# Patient Record
Sex: MALE | Race: WHITE | NOT HISPANIC OR LATINO | Employment: FULL TIME | URBAN - METROPOLITAN AREA
[De-identification: names, ages, dates, MRNs, and addresses within clinical notes are randomized per-mention and may not be internally consistent; named-entity substitution may affect disease eponyms.]

---

## 2018-06-20 ENCOUNTER — RECORDS - HEALTHEAST (OUTPATIENT)
Dept: LAB | Facility: CLINIC | Age: 53
End: 2018-06-20

## 2018-06-20 LAB
ANION GAP SERPL CALCULATED.3IONS-SCNC: 8 MMOL/L (ref 5–18)
BUN SERPL-MCNC: 14 MG/DL (ref 8–22)
CALCIUM SERPL-MCNC: 9.6 MG/DL (ref 8.5–10.5)
CHLORIDE BLD-SCNC: 111 MMOL/L (ref 98–107)
CHOLEST SERPL-MCNC: 230 MG/DL
CO2 SERPL-SCNC: 22 MMOL/L (ref 22–31)
CREAT SERPL-MCNC: 0.89 MG/DL (ref 0.7–1.3)
FASTING STATUS PATIENT QL REPORTED: ABNORMAL
GFR SERPL CREATININE-BSD FRML MDRD: >60 ML/MIN/1.73M2
GLUCOSE BLD-MCNC: 89 MG/DL (ref 70–125)
HDLC SERPL-MCNC: 38 MG/DL
LDLC SERPL CALC-MCNC: 140 MG/DL
POTASSIUM BLD-SCNC: 4.3 MMOL/L (ref 3.5–5)
SODIUM SERPL-SCNC: 141 MMOL/L (ref 136–145)
TRIGL SERPL-MCNC: 259 MG/DL

## 2019-07-22 ENCOUNTER — RECORDS - HEALTHEAST (OUTPATIENT)
Dept: LAB | Facility: CLINIC | Age: 54
End: 2019-07-22

## 2019-07-22 LAB — PSA SERPL-MCNC: 2.1 NG/ML (ref 0–3.5)

## 2019-07-23 LAB — BACTERIA SPEC CULT: NO GROWTH

## 2019-12-16 ENCOUNTER — RECORDS - HEALTHEAST (OUTPATIENT)
Dept: LAB | Facility: CLINIC | Age: 54
End: 2019-12-16

## 2019-12-17 LAB — BACTERIA SPEC CULT: NO GROWTH

## 2019-12-31 ENCOUNTER — RECORDS - HEALTHEAST (OUTPATIENT)
Dept: LAB | Facility: CLINIC | Age: 54
End: 2019-12-31

## 2019-12-31 LAB
ALBUMIN UR-MCNC: NEGATIVE MG/DL
APPEARANCE UR: CLEAR
BACTERIA #/AREA URNS HPF: NORMAL HPF
BILIRUB UR QL STRIP: NEGATIVE
COLOR UR AUTO: COLORLESS
GLUCOSE UR STRIP-MCNC: NEGATIVE MG/DL
HGB UR QL STRIP: NEGATIVE
KETONES UR STRIP-MCNC: NEGATIVE MG/DL
LEUKOCYTE ESTERASE UR QL STRIP: NEGATIVE
NITRATE UR QL: NEGATIVE
PH UR STRIP: 6 [PH] (ref 4.5–8)
RBC #/AREA URNS AUTO: NORMAL HPF
SP GR UR STRIP: 1 (ref 1–1.03)
SQUAMOUS #/AREA URNS AUTO: NORMAL LPF
UROBILINOGEN UR STRIP-ACNC: NORMAL
WBC #/AREA URNS AUTO: NORMAL HPF

## 2020-01-01 LAB — BACTERIA SPEC CULT: NO GROWTH

## 2022-10-21 ENCOUNTER — LAB REQUISITION (OUTPATIENT)
Dept: LAB | Facility: CLINIC | Age: 57
End: 2022-10-21

## 2022-10-21 DIAGNOSIS — Z13.220 ENCOUNTER FOR SCREENING FOR LIPOID DISORDERS: ICD-10-CM

## 2022-10-21 DIAGNOSIS — I10 ESSENTIAL (PRIMARY) HYPERTENSION: ICD-10-CM

## 2022-10-21 LAB
ANION GAP SERPL CALCULATED.3IONS-SCNC: 12 MMOL/L (ref 7–15)
BUN SERPL-MCNC: 18.6 MG/DL (ref 6–20)
CALCIUM SERPL-MCNC: 9.6 MG/DL (ref 8.6–10)
CHLORIDE SERPL-SCNC: 102 MMOL/L (ref 98–107)
CHOLEST SERPL-MCNC: 211 MG/DL
CREAT SERPL-MCNC: 0.96 MG/DL (ref 0.67–1.17)
DEPRECATED HCO3 PLAS-SCNC: 24 MMOL/L (ref 22–29)
GFR SERPL CREATININE-BSD FRML MDRD: >90 ML/MIN/1.73M2
GLUCOSE SERPL-MCNC: 93 MG/DL (ref 70–99)
HDLC SERPL-MCNC: 44 MG/DL
LDLC SERPL CALC-MCNC: 138 MG/DL
NONHDLC SERPL-MCNC: 167 MG/DL
POTASSIUM SERPL-SCNC: 4.3 MMOL/L (ref 3.4–5.3)
SODIUM SERPL-SCNC: 138 MMOL/L (ref 136–145)
TRIGL SERPL-MCNC: 144 MG/DL

## 2022-10-21 PROCEDURE — 80048 BASIC METABOLIC PNL TOTAL CA: CPT | Performed by: FAMILY MEDICINE

## 2022-10-21 PROCEDURE — 80061 LIPID PANEL: CPT | Performed by: FAMILY MEDICINE

## 2022-12-09 ENCOUNTER — LAB REQUISITION (OUTPATIENT)
Dept: LAB | Facility: CLINIC | Age: 57
End: 2022-12-09

## 2022-12-09 DIAGNOSIS — I10 ESSENTIAL (PRIMARY) HYPERTENSION: ICD-10-CM

## 2022-12-09 LAB
ALBUMIN SERPL BCG-MCNC: 4.6 G/DL (ref 3.5–5.2)
ALP SERPL-CCNC: 100 U/L (ref 40–129)
ALT SERPL W P-5'-P-CCNC: 49 U/L (ref 10–50)
ANION GAP SERPL CALCULATED.3IONS-SCNC: 14 MMOL/L (ref 7–15)
AST SERPL W P-5'-P-CCNC: 25 U/L (ref 10–50)
BILIRUB SERPL-MCNC: 0.3 MG/DL
BUN SERPL-MCNC: 12.8 MG/DL (ref 6–20)
CALCIUM SERPL-MCNC: 9.4 MG/DL (ref 8.6–10)
CHLORIDE SERPL-SCNC: 107 MMOL/L (ref 98–107)
CREAT SERPL-MCNC: 0.99 MG/DL (ref 0.67–1.17)
DEPRECATED HCO3 PLAS-SCNC: 25 MMOL/L (ref 22–29)
GFR SERPL CREATININE-BSD FRML MDRD: 89 ML/MIN/1.73M2
GLUCOSE SERPL-MCNC: 91 MG/DL (ref 70–99)
POTASSIUM SERPL-SCNC: 4.9 MMOL/L (ref 3.4–5.3)
PROT SERPL-MCNC: 7.4 G/DL (ref 6.4–8.3)
SODIUM SERPL-SCNC: 146 MMOL/L (ref 136–145)

## 2022-12-09 PROCEDURE — 80053 COMPREHEN METABOLIC PANEL: CPT | Performed by: FAMILY MEDICINE

## 2022-12-09 PROCEDURE — 82040 ASSAY OF SERUM ALBUMIN: CPT | Performed by: FAMILY MEDICINE

## 2022-12-23 ENCOUNTER — LAB REQUISITION (OUTPATIENT)
Dept: LAB | Facility: CLINIC | Age: 57
End: 2022-12-23

## 2022-12-23 DIAGNOSIS — R31.9 HEMATURIA, UNSPECIFIED: ICD-10-CM

## 2022-12-23 PROCEDURE — 87086 URINE CULTURE/COLONY COUNT: CPT | Performed by: FAMILY MEDICINE

## 2022-12-24 LAB — BACTERIA UR CULT: NORMAL

## 2023-09-19 ENCOUNTER — ANCILLARY PROCEDURE (OUTPATIENT)
Dept: GENERAL RADIOLOGY | Facility: CLINIC | Age: 58
End: 2023-09-19
Attending: NURSE PRACTITIONER
Payer: COMMERCIAL

## 2023-09-19 ENCOUNTER — OFFICE VISIT (OUTPATIENT)
Dept: URGENT CARE | Facility: URGENT CARE | Age: 58
End: 2023-09-19
Payer: COMMERCIAL

## 2023-09-19 VITALS
OXYGEN SATURATION: 98 % | HEART RATE: 85 BPM | DIASTOLIC BLOOD PRESSURE: 88 MMHG | WEIGHT: 207 LBS | SYSTOLIC BLOOD PRESSURE: 150 MMHG | TEMPERATURE: 98.1 F

## 2023-09-19 DIAGNOSIS — T14.8XXA ABRASION: ICD-10-CM

## 2023-09-19 DIAGNOSIS — S69.92XA HAND INJURY, LEFT, INITIAL ENCOUNTER: ICD-10-CM

## 2023-09-19 DIAGNOSIS — S62.92XA CLOSED FRACTURE OF LEFT HAND, INITIAL ENCOUNTER: ICD-10-CM

## 2023-09-19 PROCEDURE — 99203 OFFICE O/P NEW LOW 30 MIN: CPT | Mod: 25 | Performed by: NURSE PRACTITIONER

## 2023-09-19 PROCEDURE — 29125 APPL SHORT ARM SPLINT STATIC: CPT | Mod: LT | Performed by: NURSE PRACTITIONER

## 2023-09-19 PROCEDURE — 73130 X-RAY EXAM OF HAND: CPT | Mod: TC | Performed by: RADIOLOGY

## 2023-09-19 PROCEDURE — 90715 TDAP VACCINE 7 YRS/> IM: CPT | Performed by: NURSE PRACTITIONER

## 2023-09-19 PROCEDURE — 90471 IMMUNIZATION ADMIN: CPT | Performed by: NURSE PRACTITIONER

## 2023-09-19 RX ORDER — LOSARTAN POTASSIUM 50 MG/1
TABLET ORAL
COMMUNITY
Start: 2023-01-03

## 2023-09-19 NOTE — PROGRESS NOTES
Chief Complaint   Patient presents with    Urgent Care     Pt in clinic to have eval for left knee, bilateral hands and head injury due to fall today.     SUBJECTIVE:  Ashok Blood is a 58 year old male who presents to the clinic today with an injury that occurred a couple hours ago.  He was walking his dog and fell on concrete. Severe left hand fourth digit edema pain discoloration.  His wedding ring has become so tight that there is purple discoloration and some numbness.  He cannot get it off.  Has full range of motion no pallor or cool sensation pulselessness.  Multiple abrasions across body.  He did hit his head but declines severe headache loss of consciousness vomiting confusion trouble walking talking anticoagulation use.  Last Tdap was done in 2013.    No past medical history on file.  losartan (COZAAR) 50 MG tablet, 1 tab(s) orally once a day for 90    No current facility-administered medications on file prior to visit.    Social History     Tobacco Use    Smoking status: Never    Smokeless tobacco: Never   Substance Use Topics    Alcohol use: Not on file     No Known Allergies    Review of Systems  All systems negative except those listed above in HPI.    EXAM:   BP (!) 150/88   Pulse 85   Temp 98.1  F (36.7  C) (Temporal)   Wt 93.9 kg (207 lb)   SpO2 98%     Physical Exam  Vitals reviewed.   Constitutional:       Appearance: Normal appearance.   HENT:      Head: Normocephalic and atraumatic.      Nose: Nose normal.      Mouth/Throat:      Mouth: Mucous membranes are moist.      Pharynx: Oropharynx is clear.   Eyes:      Extraocular Movements: Extraocular movements intact.      Conjunctiva/sclera: Conjunctivae normal.      Pupils: Pupils are equal, round, and reactive to light.   Cardiovascular:      Rate and Rhythm: Normal rate.      Pulses: Normal pulses.   Pulmonary:      Effort: Pulmonary effort is normal.   Musculoskeletal:         General: Swelling, tenderness, deformity and signs of  injury present. Normal range of motion.      Cervical back: Normal range of motion and neck supple.   Skin:     General: Skin is warm and dry.      Findings: Bruising and erythema present. No rash.   Neurological:      General: No focal deficit present.      Mental Status: He is alert and oriented to person, place, and time.      Sensory: Sensory deficit present.      Motor: No weakness.      Coordination: Coordination normal.      Gait: Gait normal.   Psychiatric:         Mood and Affect: Mood normal.         Behavior: Behavior normal.       X-ray done in clinic read by me as positive for fourth and fifth proximal phalanx fractures.  Results for orders placed or performed in visit on 09/19/23   XR Hand Left G/E 3 Views     Status: None    Narrative    EXAM: XR HAND LEFT G/E 3 VIEWS  LOCATION: [facility]  DATE: 9/19/2023    INDICATION: Fell 2 hours ago on concrete, severe left hand 4th digit edema, pain, discoloration, had to cut ring off in clinic.  COMPARISON: None.      Impression    IMPRESSION: Acute mildly displaced and angulated oblique fractures of the 4th and 5th proximal phalanges with probable intra-articular extension into the MCP joints. Associated soft tissue swelling.    NOTE: ABNORMAL REPORT    THE DICTATION ABOVE DESCRIBES AN ABNORMALITY FOR WHICH FOLLOW-UP IS NEEDED.     ASSESSMENT:    ICD-10-CM    1. Hand injury, left, initial encounter  S69.92XA XR Hand Left G/E 3 Views      2. Abrasion  T14.8XXA TDAP 7+ (ADACEL,BOOSTRIX)      3. Closed fracture of left hand, initial encounter  S62.92XA Orthopedic  Referral     APPLY SHORT ARM SPLINT STATIC        PLAN:    Procedure: Left fourth finger wedding ring was cut off using the metal ring  the clinic.  Patient tolerated well.  The numbness resolved completely following this.  Ulnar gutter splint applied with stockinette Ortho-Glass and Ace wrap's.    Left hand fracture at fourth and fifth proximal  digits  Shared decision making with patient to remove the ring as he was starting to become numb and purple  Splint applied in clinic  Rest ice elevate  Rotate Tylenol ibuprofen, patient declines pain meds  Ortho for follow-up ER if severe pain pallor cool sensation pulselessness    Follow up with primary care provider with any problems, questions or concerns or if symptoms worsen or fail to improve. Patient agreed to plan and verbalized understanding.    Marlee Levy, MLIEP-BC  [facility] [place]

## 2023-09-22 NOTE — TELEPHONE ENCOUNTER
DIAGNOSIS: Closed fracture of left hand, initial encounter \ Negra, Vandana NP \ imaging\ [facility]\ ortho con    APPOINTMENT DATE: 09/30/23   NOTES STATUS DETAILS   DISCHARGE SUMMARY from hospital Internal 09/19/23 - Marlee Omer NP   MEDICATION LIST Internal    XRAYS  & INJECTIONS (IMAGES & REPORTS) Internal XR L HAND:  - 09/19/23

## 2023-09-25 DIAGNOSIS — M79.642 LEFT HAND PAIN: Primary | ICD-10-CM

## 2023-09-25 ASSESSMENT — ENCOUNTER SYMPTOMS
BACK PAIN: 0
NECK PAIN: 0
MUSCLE CRAMPS: 0
STIFFNESS: 0
ARTHRALGIAS: 0
JOINT SWELLING: 0
MYALGIAS: 0
MUSCLE WEAKNESS: 0

## 2023-09-25 NOTE — TELEPHONE ENCOUNTER
DIAGNOSIS: Left Hand Fx   APPOINTMENT DATE: 09/26/2023   NOTES STATUS DETAILS   OFFICE NOTE from referring provider Internal 09/19/2023 - [facility]   MEDICATION LIST Internal    XRAYS (IMAGES & REPORTS) Internal

## 2023-09-26 ENCOUNTER — ANCILLARY PROCEDURE (OUTPATIENT)
Dept: GENERAL RADIOLOGY | Facility: CLINIC | Age: 58
End: 2023-09-26
Attending: PHYSICIAN ASSISTANT
Payer: COMMERCIAL

## 2023-09-26 ENCOUNTER — PRE VISIT (OUTPATIENT)
Dept: ORTHOPEDICS | Facility: CLINIC | Age: 58
End: 2023-09-26

## 2023-09-26 ENCOUNTER — OFFICE VISIT (OUTPATIENT)
Dept: ORTHOPEDICS | Facility: CLINIC | Age: 58
End: 2023-09-26
Payer: COMMERCIAL

## 2023-09-26 DIAGNOSIS — M79.642 HAND PAIN, LEFT: ICD-10-CM

## 2023-09-26 DIAGNOSIS — S62.615A CLOSED DISPLACED FRACTURE OF PROXIMAL PHALANX OF LEFT RING FINGER, INITIAL ENCOUNTER: Primary | ICD-10-CM

## 2023-09-26 DIAGNOSIS — M79.642 HAND PAIN, LEFT: Primary | ICD-10-CM

## 2023-09-26 DIAGNOSIS — S62.92XA CLOSED FRACTURE OF LEFT HAND, INITIAL ENCOUNTER: ICD-10-CM

## 2023-09-26 DIAGNOSIS — S62.617A CLOSED DISPLACED FRACTURE OF PROXIMAL PHALANX OF LEFT LITTLE FINGER, INITIAL ENCOUNTER: ICD-10-CM

## 2023-09-26 PROBLEM — N41.0 ACUTE PROSTATITIS: Status: ACTIVE | Noted: 2020-01-15

## 2023-09-26 PROCEDURE — 73130 X-RAY EXAM OF HAND: CPT | Mod: LT | Performed by: RADIOLOGY

## 2023-09-26 PROCEDURE — 99204 OFFICE O/P NEW MOD 45 MIN: CPT | Mod: 25 | Performed by: PHYSICIAN ASSISTANT

## 2023-09-26 PROCEDURE — 29125 APPL SHORT ARM SPLINT STATIC: CPT | Mod: LT | Performed by: PHYSICIAN ASSISTANT

## 2023-09-26 NOTE — NURSING NOTE
Teaching Flowsheet   Relevant Diagnosis: Left 4th and 5th metacarpal proximal phalangeal fractures  Teaching Topic: Closed reduction percutaneous pinning VS ORIF of the left 4th and 5th metacarpal proximal phalangeal fractures.    CSC under MAC with Regional Block anesthesia with Dr Elli Lamb    Patient has pre-op with PCP office scheduled for tomorrow 9-27-23.    Person(s) involved in teaching:   Patient     Motivation Level:  Asks Questions: Yes  Eager to Learn: Yes  Cooperative: Yes  Receptive (willing/able to accept information): Yes  Any cultural factors/Shinto beliefs that may influence understanding or compliance? No    Patient demonstrates understanding of the following:  Reason for the appointment, diagnosis and treatment plan: Yes  Knowledge of proper use of medications and conditions for which they are ordered (with special attention to potential side effects or drug interactions): Yes  Which situations necessitate calling provider and whom to contact: Yes    Teaching Concerns Addressed:   Proper use and care of  (medical equip, care aids, etc.): Yes  Nutritional needs and diet plan: Yes  Pain management techniques: Yes  Wound Care: Yes  How and/when to access community resources: Yes     Instructional Materials Used/Given: Preoperative surgery packet, antibacterial Chlorhexidine soap. Stop Light Tool reviewed, after-hours number provided, patient verbalized understanding, had no immediate questions. Gabrielle Llamas RN

## 2023-09-26 NOTE — NURSING NOTE
Reason For Visit:   Chief Complaint   Patient presents with    Consult     Consult for mildly displaced left 4th and 5th metacarpal proximal phalange fracture DOI 9/19/23       Primary MD: No Ref-Primary, Physician  Ref. MD: Dr. Holley    Age: 58 year old    ?  No      There were no vitals taken for this visit.      Pain Assessment  Patient Currently in Pain: Yes (tripped while walking the dog)  0-10 Pain Scale: 3  Primary Pain Location: Finger (Comment which one) (left fourth and fifth)  Pain Descriptors: Dull, Intermittent  Alleviating Factors: Other (comment), NSAIDS (splinting)    Hand Dominance Evaluation  Hand Dominance: Right          QuickDASH Assessment      9/25/2023     4:33 PM   QuickDASH Main   1. Open a tight or new jar Mild difficulty   2. Do heavy household chores (e.g., wash walls, floors) Mild difficulty   3. Carry a shopping bag or briefcase Mild difficulty   4. Wash your back Mild difficulty   5. Use a knife to cut food Mild difficulty   6. Recreational activities in which you take some force or impact through your arm, shoulder or hand (e.g., golf, hammering, tennis, etc.) Mild difficulty   7. During the past week, to what extent has your arm, shoulder or hand problem interfered with your normal social activities with family, friends, neighbours or groups Slightly   8. During the past week, were you limited in your work or other regular daily activities as a result of your arm, shoulder or hand problem Slightly limited   9. Arm, shoulder or hand pain Mild   10.Tingling (pins and needles) in your arm,shoulder or hand None   11. During the past week, how much difficulty have you had sleeping because of the pain in your arm, shoulder or hand Mild difficulty   Quickdash Ability Score 22.73          Current Outpatient Medications   Medication Sig Dispense Refill    losartan (COZAAR) 50 MG tablet 1 tab(s) orally once a day for 90         No Known Allergies    Valentina Bae, ATC

## 2023-09-26 NOTE — PROGRESS NOTES
Cast/splint application    Date/Time: 9/26/2023 3:38 PM    Performed by: Wilmer Gutierrez ATC  Authorized by: Jeanette Dave PA-C    Consent:     Consent obtained:  Verbal    Consent given by:  Patient    Risks discussed:  Discoloration, numbness, pain and swelling  Pre-procedure details:     Sensation:  Normal  Procedure details:     Laterality:  Left    Location:  Finger    Finger:  L ring finger and L small finger    Strapping: no      Splint type:  Finger (static) and ulnar gutter    Supplies:  Fiberglass  Post-procedure details:     Pain:  Unchanged    Sensation:  Normal    Patient tolerance of procedure:  Tolerated well, no immediate complications    Patient provided with cast or splint care instructions: Yes

## 2023-09-26 NOTE — LETTER
9/26/2023         RE: Ashok Blood  [address]  [place] [facility] [address]        Dear Colleague,    Thank you for referring your patient, Ashok Blood, to the [facility] [place]. Please see a copy of my visit note below.    Hand Surgery History & Physical    REFERRING PHYSICIAN: No ref. provider found   PRIMARY CARE PHYSICIAN: No Ref-Primary, Physician     Chief Complaint:   Consult (Consult for mildly displaced left 4th and 5th metacarpal proximal phalange fracture DOI 9/19/23)    History of Present Illness:   Ashok Blood is a 58 year old right-hand dominant male who presents for evaluation of left 4th and 5th proximal phalanx fracture. This occurred on 9/19/23 when he fell onto an outstretched hand while walking his dog. He was seen at    Urgent Care where he  was placed in an ulnar gutter splint . They have been taking Ibuprofen. Pain has been well controlled. Denies numbness or tingling.     Patient is quite active. He enjoys biking. He will be leaving on a road trip next week.     Occupation: .     Past Medical History:   No past medical history on file.    Past Surgical History:   No past surgical history on file.    Social History:     Social History     Tobacco Use    Smoking status: Never    Smokeless tobacco: Never   Substance Use Topics    Alcohol use: Not on file       Family History:   No family history on file.    Allergies:   No Known Allergies    Medications:     Current Outpatient Medications   Medication    losartan (COZAAR) 50 MG tablet     No current facility-administered medications for this visit.      Review of Systems:     A 10 point ROS was performed and reviewed. Specific responses to these questions are noted at the end of the document.    Physical Exam:   Physical Exam Adult:    Musculoskeletal: A focused physical examination of the left upper extremity reveals:   Inspection -  Moderate edema to the 4th and 5th fingers and  dorsal hand. Ecchymosis to the dorsal and volar hand.  Palpation - Tenderness to palpation of the 4th and 5th fingers. Nontender throughout the remaining hand.   Neurovascular- intact light touch sensation and motor to distribution of the median, ulnar and radial nerves. Brisk capillary refill to the distal fingers. 2+ radial pulse.   Range of Motion: Flexion of the 4th and 5th fingers limited secondary to pain and swelling. Mild radial rotational deformity of the small finger, mild ulnar rotational deformity of the ring finger.   Muscle strength and tone: 5/5 strength EPL, FPL, APB, first dorsal interosseous.             Imaging:   3 view X-ray of the left hand was independently interpreted by me. The results were discussed with the patient.  Findings include: left 4th and 5th proximal phalanx base fractures with mild hyperextension.      Assessment and Plan:   Assessment:  58 year old male with left 4th and 5th proximal phalanx fractures     Plan: We had a lengthy discussion about the diagnosis, radiographic findings, and possible treatment options.  I discussed with the patient that sometimes proximal phalanx fractures can be treated with and without surgery. We discussed non operative management with cast immobilization. We discussed that during this time the fracture alignment would not improve and could worsen. There may be some deformity to the fingers long term. Operative intervention would be in the form of a closed vs open reduction and percutaneous pinning. I briefly described the procedure and post operative plan. I discussed the risks and benefits of surgery, including but not limited to: infection, bleeding, pain, scarring, damage to nerves, blood vessels, or other nearby structures, malunion, nonunion, hardware failure or malposition, stiffness, need for further surgery, wound healing issues, and anesthetic risks. After full consideration, patient elected to proceed with surgical intervention. This  will be performed by Dr. Elli Lamb. Patient will be placed in a new ulanr gutter splint today that should remain intact until until surgery.     Radiographs and plan discussed with Dr. Elli Lamb who is in agreement with the plan.     Jeanette Dave PA-C   Orthopedic Surgery  9/26/2023 3:09 PM      Cast/splint application    Date/Time: 9/26/2023 3:38 PM    Performed by: Wilmer Gutierrez ATC  Authorized by: Jeanette Dave PA-C    Consent:     Consent obtained:  Verbal    Consent given by:  Patient    Risks discussed:  Discoloration, numbness, pain and swelling  Pre-procedure details:     Sensation:  Normal  Procedure details:     Laterality:  Left    Location:  Finger    Finger:  L ring finger and L small finger    Strapping: no      Splint type:  Finger (static) and ulnar gutter    Supplies:  Fiberglass  Post-procedure details:     Pain:  Unchanged    Sensation:  Normal    Patient tolerance of procedure:  Tolerated well, no immediate complications    Patient provided with cast or splint care instructions: Yes

## 2023-09-26 NOTE — PROGRESS NOTES
Hand Surgery History & Physical    REFERRING PHYSICIAN: No ref. provider found   PRIMARY CARE PHYSICIAN: No Ref-Primary, Physician     Chief Complaint:   Consult (Consult for mildly displaced left 4th and 5th metacarpal proximal phalange fracture DOI 9/19/23)    History of Present Illness:   Ashok Blood is a 58 year old right-hand dominant male who presents for evaluation of left 4th and 5th proximal phalanx fracture. This occurred on 9/19/23 when he fell onto an outstretched hand while walking his dog. He was seen at    Urgent Care where he  was placed in an ulnar gutter splint . They have been taking Ibuprofen. Pain has been well controlled. Denies numbness or tingling.     Patient is quite active. He enjoys biking. He will be leaving on a road trip next week.     Occupation: .     Past Medical History:   No past medical history on file.    Past Surgical History:   No past surgical history on file.    Social History:     Social History     Tobacco Use    Smoking status: Never    Smokeless tobacco: Never   Substance Use Topics    Alcohol use: Not on file       Family History:   No family history on file.    Allergies:   No Known Allergies    Medications:     Current Outpatient Medications   Medication    losartan (COZAAR) 50 MG tablet     No current facility-administered medications for this visit.      Review of Systems:     A 10 point ROS was performed and reviewed. Specific responses to these questions are noted at the end of the document.    Physical Exam:   Physical Exam Adult:    Musculoskeletal: A focused physical examination of the left upper extremity reveals:   Inspection -  Moderate edema to the 4th and 5th fingers and dorsal hand. Ecchymosis to the dorsal and volar hand.  Palpation - Tenderness to palpation of the 4th and 5th fingers. Nontender throughout the remaining hand.   Neurovascular- intact light touch sensation and motor to distribution of the median, ulnar and radial  nerves. Brisk capillary refill to the distal fingers. 2+ radial pulse.   Range of Motion: Flexion of the 4th and 5th fingers limited secondary to pain and swelling. Mild radial rotational deformity of the small finger, mild ulnar rotational deformity of the ring finger.   Muscle strength and tone: 5/5 strength EPL, FPL, APB, first dorsal interosseous.             Imaging:   3 view X-ray of the left hand was independently interpreted by me. The results were discussed with the patient.  Findings include: left 4th and 5th proximal phalanx base fractures with mild hyperextension.      Assessment and Plan:   Assessment:  58 year old male with left 4th and 5th proximal phalanx fractures     Plan: We had a lengthy discussion about the diagnosis, radiographic findings, and possible treatment options.  I discussed with the patient that sometimes proximal phalanx fractures can be treated with and without surgery. We discussed non operative management with cast immobilization. We discussed that during this time the fracture alignment would not improve and could worsen. There may be some deformity to the fingers long term. Operative intervention would be in the form of a closed vs open reduction and percutaneous pinning. I briefly described the procedure and post operative plan. I discussed the risks and benefits of surgery, including but not limited to: infection, bleeding, pain, scarring, damage to nerves, blood vessels, or other nearby structures, malunion, nonunion, hardware failure or malposition, stiffness, need for further surgery, wound healing issues, and anesthetic risks. After full consideration, patient elected to proceed with surgical intervention. This will be performed by Dr. Elli Lamb. Patient will be placed in a new ulanr gutter splint today that should remain intact until until surgery.     Radiographs and plan discussed with Dr. Elli Lamb who is in agreement with the plan.     Jeanette Dave,  JASON   Orthopedic Surgery  9/26/2023 3:09 PM

## 2023-09-27 ENCOUNTER — ANESTHESIA EVENT (OUTPATIENT)
Dept: SURGERY | Facility: AMBULATORY SURGERY CENTER | Age: 58
End: 2023-09-27
Payer: COMMERCIAL

## 2023-09-27 ENCOUNTER — LAB REQUISITION (OUTPATIENT)
Dept: LAB | Facility: CLINIC | Age: 58
End: 2023-09-27

## 2023-09-27 ENCOUNTER — TELEPHONE (OUTPATIENT)
Dept: ORTHOPEDICS | Facility: CLINIC | Age: 58
End: 2023-09-27
Payer: COMMERCIAL

## 2023-09-27 DIAGNOSIS — I10 ESSENTIAL (PRIMARY) HYPERTENSION: ICD-10-CM

## 2023-09-27 PROCEDURE — 82310 ASSAY OF CALCIUM: CPT | Performed by: PHYSICIAN ASSISTANT

## 2023-09-27 NOTE — TELEPHONE ENCOUNTER
Patient is scheduled for surgery with Dr. Lamb    Spoke with: Ashok    Date of Surgery: 9/28/23    Location: [facility]    Informed patient they will need an adult  Yes     Pre op with Provider PCP, patient has pre-op scheduled for 9/27/23    Additional imaging/appointments: POP Made-pt requested 10/11/23 for f/u.    Surgery packet: Received     Additional comments: N/A        Krystal Field on 9/27/2023 at 8:55 AM

## 2023-09-28 ENCOUNTER — ANCILLARY PROCEDURE (OUTPATIENT)
Dept: RADIOLOGY | Facility: AMBULATORY SURGERY CENTER | Age: 58
End: 2023-09-28
Attending: STUDENT IN AN ORGANIZED HEALTH CARE EDUCATION/TRAINING PROGRAM
Payer: COMMERCIAL

## 2023-09-28 ENCOUNTER — ANESTHESIA (OUTPATIENT)
Dept: SURGERY | Facility: AMBULATORY SURGERY CENTER | Age: 58
End: 2023-09-28
Payer: COMMERCIAL

## 2023-09-28 ENCOUNTER — HOSPITAL ENCOUNTER (OUTPATIENT)
Facility: AMBULATORY SURGERY CENTER | Age: 58
Discharge: HOME | End: 2023-09-28
Attending: STUDENT IN AN ORGANIZED HEALTH CARE EDUCATION/TRAINING PROGRAM
Payer: COMMERCIAL

## 2023-09-28 VITALS
SYSTOLIC BLOOD PRESSURE: 115 MMHG | HEART RATE: 91 BPM | TEMPERATURE: 97.4 F | BODY MASS INDEX: 32.95 KG/M2 | OXYGEN SATURATION: 92 % | RESPIRATION RATE: 16 BRPM | WEIGHT: 205 LBS | HEIGHT: 66 IN | DIASTOLIC BLOOD PRESSURE: 73 MMHG

## 2023-09-28 DIAGNOSIS — M25.532 PAIN IN BOTH WRISTS: ICD-10-CM

## 2023-09-28 DIAGNOSIS — S62.617A CLOSED DISPLACED FRACTURE OF PROXIMAL PHALANX OF LEFT LITTLE FINGER, INITIAL ENCOUNTER: Primary | ICD-10-CM

## 2023-09-28 DIAGNOSIS — M25.531 PAIN IN BOTH WRISTS: ICD-10-CM

## 2023-09-28 DIAGNOSIS — S62.615A CLOSED DISPLACED FRACTURE OF PROXIMAL PHALANX OF LEFT RING FINGER, INITIAL ENCOUNTER: Primary | ICD-10-CM

## 2023-09-28 LAB
ANION GAP SERPL CALCULATED.3IONS-SCNC: 14 MMOL/L (ref 7–15)
BUN SERPL-MCNC: 16.2 MG/DL (ref 6–20)
CALCIUM SERPL-MCNC: 9.3 MG/DL (ref 8.6–10)
CHLORIDE SERPL-SCNC: 106 MMOL/L (ref 98–107)
CREAT SERPL-MCNC: 0.98 MG/DL (ref 0.67–1.17)
EGFRCR SERPLBLD CKD-EPI 2021: 90 ML/MIN/1.73M2
GLUCOSE SERPL-MCNC: 94 MG/DL (ref 70–99)
HCO3 SERPL-SCNC: 23 MMOL/L (ref 22–29)
POTASSIUM SERPL-SCNC: 4.4 MMOL/L (ref 3.4–5.3)
SODIUM SERPL-SCNC: 143 MMOL/L (ref 135–145)

## 2023-09-28 PROCEDURE — C1713 ANCHOR/SCREW BN/BN,TIS/BN: HCPCS

## 2023-09-28 PROCEDURE — 26727 TREAT FINGER FRACTURE EACH: CPT | Mod: F3

## 2023-09-28 RX ORDER — NALOXONE HYDROCHLORIDE 0.4 MG/ML
0.4 INJECTION, SOLUTION INTRAMUSCULAR; INTRAVENOUS; SUBCUTANEOUS
Status: DISCONTINUED | OUTPATIENT
Start: 2023-09-28 | End: 2023-09-29 | Stop reason: HOSPADM

## 2023-09-28 RX ORDER — FENTANYL CITRATE 50 UG/ML
25-50 INJECTION, SOLUTION INTRAMUSCULAR; INTRAVENOUS
Status: DISCONTINUED | OUTPATIENT
Start: 2023-09-28 | End: 2023-09-29 | Stop reason: HOSPADM

## 2023-09-28 RX ORDER — SODIUM CHLORIDE, SODIUM LACTATE, POTASSIUM CHLORIDE, CALCIUM CHLORIDE 600; 310; 30; 20 MG/100ML; MG/100ML; MG/100ML; MG/100ML
INJECTION, SOLUTION INTRAVENOUS CONTINUOUS
Status: DISCONTINUED | OUTPATIENT
Start: 2023-09-28 | End: 2023-09-29 | Stop reason: HOSPADM

## 2023-09-28 RX ORDER — NALOXONE HYDROCHLORIDE 0.4 MG/ML
0.2 INJECTION, SOLUTION INTRAMUSCULAR; INTRAVENOUS; SUBCUTANEOUS
Status: DISCONTINUED | OUTPATIENT
Start: 2023-09-28 | End: 2023-09-29 | Stop reason: HOSPADM

## 2023-09-28 RX ORDER — OXYCODONE HYDROCHLORIDE 5 MG/1
10 TABLET ORAL
Status: DISCONTINUED | OUTPATIENT
Start: 2023-09-28 | End: 2023-09-29 | Stop reason: HOSPADM

## 2023-09-28 RX ORDER — BUPIVACAINE HYDROCHLORIDE 5 MG/ML
INJECTION, SOLUTION EPIDURAL; INTRACAUDAL
Status: COMPLETED | OUTPATIENT
Start: 2023-09-28 | End: 2023-09-28

## 2023-09-28 RX ORDER — HYDRALAZINE HYDROCHLORIDE 20 MG/ML
2.5-5 INJECTION INTRAMUSCULAR; INTRAVENOUS EVERY 10 MIN PRN
Status: DISCONTINUED | OUTPATIENT
Start: 2023-09-28 | End: 2023-09-29 | Stop reason: HOSPADM

## 2023-09-28 RX ORDER — ONDANSETRON 2 MG/ML
4 INJECTION INTRAMUSCULAR; INTRAVENOUS EVERY 30 MIN PRN
Status: DISCONTINUED | OUTPATIENT
Start: 2023-09-28 | End: 2023-09-29 | Stop reason: HOSPADM

## 2023-09-28 RX ORDER — FENTANYL CITRATE 50 UG/ML
25 INJECTION, SOLUTION INTRAMUSCULAR; INTRAVENOUS EVERY 5 MIN PRN
Status: DISCONTINUED | OUTPATIENT
Start: 2023-09-28 | End: 2023-09-29 | Stop reason: HOSPADM

## 2023-09-28 RX ORDER — LIDOCAINE 40 MG/G
CREAM TOPICAL
Status: DISCONTINUED | OUTPATIENT
Start: 2023-09-28 | End: 2023-09-29 | Stop reason: HOSPADM

## 2023-09-28 RX ORDER — PROPOFOL 10 MG/ML
INJECTION, EMULSION INTRAVENOUS CONTINUOUS PRN
Status: DISCONTINUED | OUTPATIENT
Start: 2023-09-28 | End: 2023-09-28

## 2023-09-28 RX ORDER — ACETAMINOPHEN 325 MG/1
975 TABLET ORAL ONCE
Status: COMPLETED | OUTPATIENT
Start: 2023-09-28 | End: 2023-09-28

## 2023-09-28 RX ORDER — FLUMAZENIL 0.1 MG/ML
0.2 INJECTION, SOLUTION INTRAVENOUS
Status: DISCONTINUED | OUTPATIENT
Start: 2023-09-28 | End: 2023-09-29 | Stop reason: HOSPADM

## 2023-09-28 RX ORDER — OXYCODONE HYDROCHLORIDE 5 MG/1
5 TABLET ORAL EVERY 6 HOURS PRN
Qty: 12 TABLET | Refills: 0 | Status: SHIPPED | OUTPATIENT
Start: 2023-09-28 | End: 2023-10-01

## 2023-09-28 RX ORDER — LIDOCAINE HYDROCHLORIDE 20 MG/ML
INJECTION, SOLUTION INFILTRATION; PERINEURAL PRN
Status: DISCONTINUED | OUTPATIENT
Start: 2023-09-28 | End: 2023-09-28

## 2023-09-28 RX ORDER — CEFAZOLIN SODIUM 2 G/50ML
2 SOLUTION INTRAVENOUS SEE ADMIN INSTRUCTIONS
Status: DISCONTINUED | OUTPATIENT
Start: 2023-09-28 | End: 2023-09-29 | Stop reason: HOSPADM

## 2023-09-28 RX ORDER — LABETALOL HYDROCHLORIDE 5 MG/ML
10 INJECTION, SOLUTION INTRAVENOUS
Status: DISCONTINUED | OUTPATIENT
Start: 2023-09-28 | End: 2023-09-29 | Stop reason: HOSPADM

## 2023-09-28 RX ORDER — ONDANSETRON 4 MG/1
4 TABLET, ORALLY DISINTEGRATING ORAL EVERY 30 MIN PRN
Status: DISCONTINUED | OUTPATIENT
Start: 2023-09-28 | End: 2023-09-29 | Stop reason: HOSPADM

## 2023-09-28 RX ORDER — HYDROMORPHONE HYDROCHLORIDE 1 MG/ML
0.4 INJECTION, SOLUTION INTRAMUSCULAR; INTRAVENOUS; SUBCUTANEOUS EVERY 5 MIN PRN
Status: DISCONTINUED | OUTPATIENT
Start: 2023-09-28 | End: 2023-09-29 | Stop reason: HOSPADM

## 2023-09-28 RX ORDER — OXYCODONE HYDROCHLORIDE 5 MG/1
5 TABLET ORAL
Status: COMPLETED | OUTPATIENT
Start: 2023-09-28 | End: 2023-09-28

## 2023-09-28 RX ORDER — CEFAZOLIN SODIUM 2 G/50ML
2 SOLUTION INTRAVENOUS
Status: COMPLETED | OUTPATIENT
Start: 2023-09-28 | End: 2023-09-28

## 2023-09-28 RX ORDER — FENTANYL CITRATE 50 UG/ML
50 INJECTION, SOLUTION INTRAMUSCULAR; INTRAVENOUS EVERY 5 MIN PRN
Status: DISCONTINUED | OUTPATIENT
Start: 2023-09-28 | End: 2023-09-29 | Stop reason: HOSPADM

## 2023-09-28 RX ORDER — HYDROMORPHONE HYDROCHLORIDE 1 MG/ML
0.2 INJECTION, SOLUTION INTRAMUSCULAR; INTRAVENOUS; SUBCUTANEOUS EVERY 5 MIN PRN
Status: DISCONTINUED | OUTPATIENT
Start: 2023-09-28 | End: 2023-09-29 | Stop reason: HOSPADM

## 2023-09-28 RX ADMIN — PROPOFOL 150 MCG/KG/MIN: 10 INJECTION, EMULSION INTRAVENOUS at 13:03

## 2023-09-28 RX ADMIN — BUPIVACAINE HYDROCHLORIDE 20 ML: 5 INJECTION, SOLUTION EPIDURAL; INTRACAUDAL at 12:10

## 2023-09-28 RX ADMIN — SODIUM CHLORIDE, SODIUM LACTATE, POTASSIUM CHLORIDE, CALCIUM CHLORIDE: 600; 310; 30; 20 INJECTION, SOLUTION INTRAVENOUS at 12:35

## 2023-09-28 RX ADMIN — LIDOCAINE HYDROCHLORIDE 80 MG: 20 INJECTION, SOLUTION INFILTRATION; PERINEURAL at 13:02

## 2023-09-28 RX ADMIN — ACETAMINOPHEN 975 MG: 325 TABLET ORAL at 12:11

## 2023-09-28 RX ADMIN — OXYCODONE HYDROCHLORIDE 5 MG: 5 TABLET ORAL at 14:28

## 2023-09-28 RX ADMIN — FENTANYL CITRATE 50 MCG: 50 INJECTION, SOLUTION INTRAMUSCULAR; INTRAVENOUS at 12:36

## 2023-09-28 RX ADMIN — CEFAZOLIN SODIUM 2 G: 2 SOLUTION INTRAVENOUS at 12:57

## 2023-09-28 NOTE — ANESTHESIA PROCEDURE NOTES
Brachial plexus Procedure Note    Pre-Procedure   Staff -        Anesthesiologist:  Dangelo Rebolledo MD       Resident/Fellow: Goldberg, Leslie, MD       Performed By: resident       Location: pre-op       Procedure Start/Stop Times: 9/28/2023 12:10 PM and 9/28/2023 12:20 PM       Pre-Anesthestic Checklist: patient identified, IV checked, site marked, risks and benefits discussed, informed consent, monitors and equipment checked, pre-op evaluation, at physician/surgeon's request and post-op pain management  Timeout:       Correct Patient: Yes        Correct Procedure: Yes        Correct Site: Yes        Correct Position: Yes        Correct Laterality: Yes        Site Marked: Yes  Procedure Documentation  Procedure: Brachial plexus       Diagnosis: POST-OPERATIVE PAIN CONTROL       Laterality: right       Patient Position: supine       Patient Prep/Sterile Barriers: sterile gloves, mask       Skin prep: Chloraprep (supraclavicular approach).       Needle Type: short bevel       Needle Gauge: 21.        Needle Length (millimeters): 110        Ultrasound guided       1. Ultrasound was used to identify targeted nerve, plexus, vascular marker, or fascial plane and place a needle adjacent to it in real-time.       2. Ultrasound was used to visualize the spread of anesthetic in close proximity to the above referenced structure.       3. A permanent image is entered into the patient's record.    Assessment/Narrative         The placement was negative for: blood aspirated, painful injection and site bleeding       Paresthesias: No.       Bolus given via needle. no blood aspirated via catheter.        Secured via.        Insertion/Infusion Method: Single Shot       Complications: none       Injection made incrementally with aspirations every 5 mL.    Medication(s) Administered   Bupivacaine 0.5% PF (Infiltration) - Infiltration   20 mL - 9/28/2023 12:10:00 PM  Medication Administration Time: 9/28/2023 12:10 PM      FOR  "[facility]) ONLY:   Pain Team Contact information: please page the Pain Team Via [employer]. Search \"Pain\". During daytime hours, please page the attending first. At night please page the resident first.      "

## 2023-09-28 NOTE — PROGRESS NOTES
Pt received left sided brachial plexus nerve block without exparel. Pt received 1 mg Versed and 50 mcg Fentanyl IV. Pt tolerated procedure without immediate complication. VSS

## 2023-09-28 NOTE — ANESTHESIA CARE TRANSFER NOTE
Patient: Ashok Blood    Procedure: Procedure(s):  LEFT CLOSED  REDUCTION, FRACTURE, FOURTH AND FIFTH FINGER, WITH PERCUTANEOUS PINNING       Diagnosis: Closed displaced fracture of proximal phalanx of left ring finger, initial encounter [D08.263U]  Closed displaced fracture of proximal phalanx of left little finger, initial encounter [P94.170H]  Diagnosis Additional Information: No value filed.    Anesthesia Type:   Peripheral Nerve Block, MAC     Note:    Oropharynx: oropharynx clear of all foreign objects and spontaneously breathing  Level of Consciousness: awake  Oxygen Supplementation: room air    Independent Airway: airway patency satisfactory and stable  Dentition: dentition unchanged  Vital Signs Stable: post-procedure vital signs reviewed and stable  Report to RN Given: handoff report given  Patient transferred to: Phase II    Handoff Report: Identifed the Patient, Identified the Reponsible Provider, Reviewed the pertinent medical history, Discussed the surgical course, Reviewed Intra-OP anesthesia mangement and issues during anesthesia, Set expectations for post-procedure period and Allowed opportunity for questions and acknowledgement of understanding      Vitals:  Vitals Value Taken Time   /73 09/28/23 1353   Temp 36.3  C (97.4  F) 09/28/23 1353   Pulse 91 09/28/23 1353   Resp 16 09/28/23 1353   SpO2 92 % 09/28/23 1353       Electronically Signed By: SARA HEADLEY  September 28, 2023  1:54 PM

## 2023-09-28 NOTE — OP NOTE
OPERATIVE REPORT    PATIENT NAME: Ashok Blood  MRN: [id number]    DATE: 9/28/2023    PREOPERATIVE DIAGNOSIS:   1. Closed displaced fracture of the proximal phalanx of the left small finger.  2. Closed displaced fracture of the proximal phalanx of the left ring finger.    POSTOPERATIVE DIAGNOSIS:   1. Closed displaced fracture of the proximal phalanx of the left small finger.  2. Closed displaced fracture of the proximal phalanx of the left ring finger.    OPERATION:   1. Left small finger proximal phalanx closed reduction percutaneous pinning.  2. Left ring finger proximal phalanx closed reduction percutaneous pinning.    SURGEON: Elli Lamb MD     STAFF: Circulator: Moses Austin RN  Scrub Person: Arin Aguilar    ANESTHESIA:  Regional block and IV sedation    IMPLANT(S):   Implant Name Type Inv. Item Serial No.  Lot No. LRB No. Used Action   .45 K wire      Left 5 Implanted       SPECIMEN: * No specimens in log *    ESTIMATED BLOOD LOSS: 5 mL     FLUIDS: See anesthesia records.     URINE OUTPUT: Not applicable.  Reynoso was not placed.     TOURNIQUET TIME: Not applicable.    COMPLICATIONS: None.     INDICATIONS: Ashok Blood is a 58 year old male who sustained a closed displaced fracture of the proximal phalanx of the left ring and small fingers with rotational and angular malalignment.  After a thorough evaluation and discussion of treatment options, the patient was indicated for operative intervention. The risks, benefits, and alternatives were discussed with the patient.  The patient verbalized understanding of the treatment plan and an informed consent was signed.    DESCRIPTION OF PROCEDURE: The patient was taken to the operating room and placed in supine position on the operating table. Anesthesia was administered by the Department of Anesthesia followed by administration of antibiotics. A high arm tourniquet was applied to the left upper extremity, which was then prepped  and draped in the usual sterile fashion.  A timeout was performed with all OR staff.  The patient name, MRN, operative extremity, procedure, allergies, antibiotics, DVT prophylaxis/SCDs, and fire precautions/plan were reviewed, and all were in agreement. The tourniquet remained deflated throughout the entirety of the case.    A closed reduction of the ring finger proximal phalanx was performed.  An acceptable reduction, with regard to length, alignment, and rotation, was obtained and confirmed with fluoroscopy.  The fracture was then stabilized with three antegrade 0.045-inch K-wires.    A closed reduction of the small finger proximal phalanx was performed.  An acceptable reduction, with regard to length, alignment, and rotation, was obtained and confirmed with fluoroscopy.  The fracture was then stabilized with two antegrade 0.045-inch K-wires.    Fluoroscopy confirmed fracture reduction and alignment on AP, lateral, and oblique views.  Clinical exam showed no rotational deformity with a normal cascade.  Sterile dressings and an ulnar gutter splint were applied.  Capillary refill was intact < 2 seconds.      The patient was aroused from anesthesia and taken to the recovery room in stable condition.      All counts were correct at the end of the case.       There were no complications.    POSTOPERATIVE PLAN: return to clinic in 1-2 weeks for pin site check, plan for pin removal at 4 weeks pending radiographic and clinical evidence of healing.    DEVORAH CHEATHAM MD

## 2023-09-28 NOTE — DISCHARGE INSTRUCTIONS
"[facility]  Home Care Following Anesthesia  For 24 hours after surgery:  Get plenty of rest.  A responsible adult must stay with you for at least 24 hours after you leave the surgery center.  Do not drive or use heavy equipment.  If you have weakness or tingling, don't drive or use heavy equipment until this feeling goes away.   Do not drink alcohol.   Avoid strenuous or risky activities.  Ask for help when climbing stairs.  You may feel lightheaded.  IF so, sit for a few minutes before standing.  Have someone help you get up.   If you have nausea (feel sick to your stomach): Drink only clear liquids such as apple juice, ginger ale, broth or 7-Up.  Rest may also help.  Be sure to drink enough fluids.  Move to a regular diet as you feel able.   You may have a slight fever.  Call the doctor if your fever is over 100 F (37.7 C) (taken under the tongue) or lasts longer than 24 hours.  You may have a dry mouth, a sore throat, muscle aches or trouble sleeping. These should go away after 24 hours.  Do not make important or legal decisions.   It is recommended to avoid smoking.        Today you received an Exparel block to numb the nerves near your surgery site.  This is a block using local anesthetic or \"numbing\" medication injected around the nerves to anesthetize or \"numb\" the area supplied by those nerves.  This block is injected into the muscle layer near your surgical site.  This medication may numb the location where you had surgery up to 72 hours.  If your surgical site is an arm or leg you should be careful with your affected limb, since it is possible to injure your limb without being aware of it due to the numbing.  Until full feeling returns, you should guard against bumping or hitting your limb, and avoid extreme hot or cold temperatures on the skin.  As the block wears off, the feeling will return as a tingling or prickly sensation near your surgical site.  You will " Specimens verified per policy.   experince more discomfort from your incision as the feeling returns.  You may want to take a pain pill (a narcotic or Tylenol if this was prescribed by your surgeon) when you start to experience mild pain before the pain beomes more severe.  If your pain medications do not control your pain, you should notify your surgeon.    Tips for taking pain medications  To get the best pain relief possible, remember these points:  Take pain medications as directed, before pain becomes severe.  Pain medication can upset your stomach: taking it with food may help.  Constipation is a common side effect of pain medication. Drink plenty of  fluids.  Eat foods high in fiber. Take a stool softener if recommended by your doctor or pharmacist.  Do not drink alcohol, drive or operate machinery while taking pain medications.  Ask about other ways to control pain, such as with heat, ice or relaxation.    Tylenol/Acetaminophen Consumption    If you feel your pain relief is insufficient, you may take Tylenol/Acetaminophen in addition to your narcotic pain medication.   Be careful not to exceed 4,000 mg of Tylenol/Acetaminophen in a 24 hour period from all sources.  If you are taking extra strength Tylenol/acetaminophen (500 mg), the maximum dose is 8 tablets in 24 hours.  If you are taking regular strength acetaminophen (325 mg), the maximum dose is 12 tablets in 24 hours.    Call a doctor for any of the following:  Signs of infection (fever, growing tenderness at the surgery site, a large amount of drainage or bleeding, severe pain, foul-smelling drainage, redness, swelling).  It has been over 8 to 10 hours since surgery and you are still not able to urinate (pass water).  Headache for over 24 hours.  Numbness, tingling or weakness the day after surgery (if you had spinal anesthesia).  Signs of Covid-19 infection (temperature over 100 degrees, shortness of breath, cough, loss of taste/smell, generalized body aches, persistent headache,  chills, sore throat, nausea/vomiting/diarrhea)  Your doctor is:       Dr. Elli Lmab, Orthopaedics: [phone]               Or dial [phone] and ask for the resident on call for:  Orthopaedics  For emergency care, call the:  [facility] Emergency Department: [phone] (TTY for hearing impaired: [phone])                        DIAGNOSIS  1. Closed displaced fracture of proximal phalanx of left little finger, initial encounter        MEDICATIONS   Resume all home medications as directed unless otherwise instructed during this hospitalization. If there is any question, double check with your primary care provider.  Start new discharge medications as directed.    Take 1-2 tablets of extra strength Tylenol 500 mg every 6 hours.    For breakthrough pain use narcotic pain medication as prescribed.    Do not drive or operate machinery while taking narcotic pain medications.   If you are taking other Tylenol containing medicines at home, be sure NOT to exceed 4 gram's (4000 milligrams) of Tylenol per day.   If you are taking pain medications, be sure to take Colace (docusate sodium) as well to prevent constipation. If constipated, try adding another cathartic or enema.  If nausea and vomiting, call the hospital or seek medical attention.    ACTIVITY   Weight bearing: Non-weight bearing to arm.    DIET  Resume same diet prior to your hospital admission.    WOUND   Leave dressing on until you are seen in clinic for your follow up visit.   Watch for signs and symptoms of infection of your wounds including; pain, redness, swelling, drainage or fever.  If you notice any of these symptoms please call or seek medical attention.    Keep wound clean, dry, and intact.  Do not submerge wounds in water until they are healed. No baths, soaking, swimming, or prolonged water exposure for 4 weeks after surgery.    RETURN   Follow-up with Orthopedic Clinic as directed.     Future Appointments   Date Time Provider Department  [place]   10/11/2023  2:45 PM Elli Lamb MD [facility]       Call the [facility] at [phone] during business hours for any symptoms such as:    * Fevers with Temperature greater than 101.5 degrees.   * Pus drainage from wound site.   * Severe pain, not controlled by medication.   * Persistent nausea, vomiting and inablility to tolerate fluids.    If you are receiving care in [place], you may call the Orthopedic clinic at [phone] Option #2.    FOR URGENT PROBLEMS ONLY, after hours or on weekends call the hospital  at [phone] and ask to speak with the orthopedic resident on call.

## 2023-09-28 NOTE — ANESTHESIA POSTPROCEDURE EVALUATION
Patient: Ashok Blood    Procedure: Procedure(s):  LEFT CLOSED  REDUCTION, FRACTURE, FOURTH AND FIFTH FINGER, WITH PERCUTANEOUS PINNING       Anesthesia Type:  Peripheral Nerve Block, MAC    Note:  Disposition: Outpatient   Postop Pain Control: Uneventful            Sign Out: Well controlled pain   PONV: No   Neuro/Psych: Uneventful            Sign Out: Acceptable/Baseline neuro status   Airway/Respiratory: Uneventful            Sign Out: Acceptable/Baseline resp. status   CV/Hemodynamics: Uneventful            Sign Out: Acceptable CV status   Other NRE: NONE   DID A NON-ROUTINE EVENT OCCUR? No           Last vitals:  Vitals Value Taken Time   /73 09/28/23 1353   Temp 36.3  C (97.4  F) 09/28/23 1353   Pulse 91 09/28/23 1353   Resp 16 09/28/23 1353   SpO2 92 % 09/28/23 1353       Electronically Signed By: Dangelo Rebolledo MD  September 28, 2023  2:58 PM

## 2023-09-28 NOTE — ANESTHESIA PREPROCEDURE EVALUATION
Anesthesia Pre-Procedure Evaluation    Patient: Ashok Blood   MRN: [id number] : 1965        Procedure : Procedure(s):  LEFT CLOSED VS OPEN REDUCTION, FRACTURE, FOURTH AND FIFTH FINGER, WITH PERCUTANEOUS PINNING          No past medical history on file.   History reviewed. No pertinent surgical history.   No Known Allergies   Social History     Tobacco Use    Smoking status: Never    Smokeless tobacco: Never   Substance Use Topics    Alcohol use: Not on file      Wt Readings from Last 1 Encounters:   23 93 kg (205 lb)        Anesthesia Evaluation            ROS/MED HX  ENT/Pulmonary:  - neg pulmonary ROS     Neurologic:  - neg neurologic ROS     Cardiovascular:  - neg cardiovascular ROS     METS/Exercise Tolerance: >4 METS    Hematologic:  - neg hematologic  ROS     Musculoskeletal:  - neg musculoskeletal ROS     GI/Hepatic:  - neg GI/hepatic ROS     Renal/Genitourinary:  - neg Renal ROS     Endo:  - neg endo ROS     Psychiatric/Substance Use:  - neg psychiatric ROS     Infectious Disease:  - neg infectious disease ROS     Malignancy:  - neg malignancy ROS     Other:  - neg other ROS          Physical Exam    Airway  airway exam normal      Mallampati: II   TM distance: > 3 FB   Neck ROM: full   Mouth opening: > 3 cm    Respiratory Devices and Support         Dental       (+) Completely normal teeth      Cardiovascular          Rhythm and rate: regular and normal     Pulmonary   pulmonary exam normal        breath sounds clear to auscultation           OUTSIDE LABS:  CBC: No results found for: WBC, HGB, HCT, PLT  BMP:   Lab Results   Component Value Date     2023     (H) 2022    POTASSIUM 4.4 2023    POTASSIUM 4.9 2022    CHLORIDE 106 2023    CHLORIDE 107 2022    CO2 23 2023    CO2 25 2022    BUN 16.2 2023    BUN 12.8 2022    CR 0.98 2023    CR 0.99 2022    GLC 94 2023    GLC 91 2022     COAGS: No  results found for: PTT, INR, FIBR  POC: No results found for: BGM, HCG, HCGS  HEPATIC:   Lab Results   Component Value Date    ALBUMIN 4.6 12/09/2022    PROTTOTAL 7.4 12/09/2022    ALT 49 12/09/2022    AST 25 12/09/2022    ALKPHOS 100 12/09/2022    BILITOTAL 0.3 12/09/2022     OTHER:   Lab Results   Component Value Date    MARIAM 9.3 09/27/2023       Anesthesia Plan    ASA Status:  1    NPO Status:  NPO Appropriate    Anesthesia Type: MAC.     - Reason for MAC: immobility needed, straight local not clinically adequate   Induction: Intravenous.   Maintenance: TIVA.        Consents    Anesthesia Plan(s) and associated risks, benefits, and realistic alternatives discussed. Questions answered and patient/representative(s) expressed understanding.     - Discussed:     - Discussed with:  Patient      - Extended Intubation/Ventilatory Support Discussed: No.      - Patient is DNR/DNI Status: No     Use of blood products discussed: No .     Postoperative Care    Pain management: IV analgesics, Oral pain medications, Peripheral nerve block (Single Shot), Multi-modal analgesia.   PONV prophylaxis: Ondansetron (or other 5HT-3), Background Propofol Infusion     Comments:                Dangelo Rebolledo MD

## 2023-09-30 ENCOUNTER — PRE VISIT (OUTPATIENT)
Dept: ORTHOPEDICS | Facility: CLINIC | Age: 58
End: 2023-09-30

## 2023-10-11 ENCOUNTER — THERAPY VISIT (OUTPATIENT)
Dept: OCCUPATIONAL THERAPY | Facility: CLINIC | Age: 58
End: 2023-10-11
Payer: COMMERCIAL

## 2023-10-11 ENCOUNTER — OFFICE VISIT (OUTPATIENT)
Dept: ORTHOPEDICS | Facility: CLINIC | Age: 58
End: 2023-10-11
Payer: COMMERCIAL

## 2023-10-11 ENCOUNTER — ANCILLARY PROCEDURE (OUTPATIENT)
Dept: GENERAL RADIOLOGY | Facility: CLINIC | Age: 58
End: 2023-10-11
Attending: STUDENT IN AN ORGANIZED HEALTH CARE EDUCATION/TRAINING PROGRAM
Payer: COMMERCIAL

## 2023-10-11 DIAGNOSIS — S62.615A CLOSED DISPLACED FRACTURE OF PROXIMAL PHALANX OF LEFT RING FINGER, INITIAL ENCOUNTER: Primary | ICD-10-CM

## 2023-10-11 DIAGNOSIS — S62.615A CLOSED DISPLACED FRACTURE OF PROXIMAL PHALANX OF LEFT RING FINGER, INITIAL ENCOUNTER: ICD-10-CM

## 2023-10-11 DIAGNOSIS — M79.642 HAND PAIN, LEFT: ICD-10-CM

## 2023-10-11 DIAGNOSIS — M79.642 HAND PAIN, LEFT: Primary | ICD-10-CM

## 2023-10-11 DIAGNOSIS — Z47.89 AFTERCARE FOLLOWING SURGERY OF THE MUSCULOSKELETAL SYSTEM: ICD-10-CM

## 2023-10-11 DIAGNOSIS — M79.645 PAIN OF FINGER OF LEFT HAND: Primary | ICD-10-CM

## 2023-10-11 DIAGNOSIS — S62.617A CLOSED DISPLACED FRACTURE OF PROXIMAL PHALANX OF LEFT LITTLE FINGER, INITIAL ENCOUNTER: ICD-10-CM

## 2023-10-11 PROCEDURE — 73130 X-RAY EXAM OF HAND: CPT | Mod: LT | Performed by: RADIOLOGY

## 2023-10-11 PROCEDURE — 99024 POSTOP FOLLOW-UP VISIT: CPT | Performed by: STUDENT IN AN ORGANIZED HEALTH CARE EDUCATION/TRAINING PROGRAM

## 2023-10-11 PROCEDURE — 97760 ORTHOTIC MGMT&TRAING 1ST ENC: CPT | Mod: GO | Performed by: OCCUPATIONAL THERAPIST

## 2023-10-11 NOTE — PROGRESS NOTES
OCCUPATIONAL THERAPY EVALUATION  Type of Visit: Evaluation    See electronic medical record for Abuse and Falls Screening details.    Subjective   Presenting condition or subjective complaint: Closed displaced fracture of the proximal phalanx of the left small finger; closed displaced fracture of the proximal phalanx of the left ring finger.  Date of onset: 09/19/2023  Procedure: Left small finger proximal phalanx closed reduction percutaneous pinning; left ring finger proximal phalanx closed reduction percutaneous pinning  Procedure date: 09/28/2023    Relevant medical history:   No past medical history on file.  Dates & types of surgery:  Past Surgical History:   Procedure Laterality Date    CLOSED REDUCTION, PERCUTANEOUS PINNING FINGER, COMBINED Left 9/28/2023    Procedure: LEFT CLOSED  REDUCTION, FRACTURE, FOURTH AND FIFTH FINGER, WITH PERCUTANEOUS PINNING;  Surgeon: Elli Lamb MD;  Location: [facility] OR     Mechanism of injury: Fell on outstretched hand walking dog.  Prior diagnostic imaging/testing results: X-ray. See EMR for imaging report.  Prior therapy history for the same diagnosis, illness or injury: Above procedure    Prior level of function:  Transfers: Independent  Ambulation: Independent  ADL: Independent  IADL: Independent    Living environment: Patient is independent at home and there are no concerns about accessibility and mobility in the home.    Employment: Desk job  Hobbies/Interests: Biking, walking dog    Patient goals for therapy: Restore function of left hand.    Objective   Right hand dominant  Patient reports symptoms of pain, stiffness/loss of motion, weakness/loss of strength, and edema    Pain Level (Scale 0-10)   10/11/2023   At Rest 2-3/10   With Use NT     Pain Description  Date 10/11/2023   Location Left hand, ring and small fingers   Pain Quality Aching and Sharp   Frequency Intermittent     Pain is worst Daytime   Exacerbated by  Bumping dorsum of hand   Relieved by Rest    Progression Gradually improving     Edema  Mild of left hand compared to right.    Sensation   WNL throughout all nerve distributions; per patient report.    ROM   Able to actively flex and extend the PIP and DIP joints, however motion is very limited.    Strength  Contraindicated at this time.    Assessment & Plan   CLINICAL IMPRESSIONS  Medical Diagnosis: Left ring and small finger proximal phalanx fractures    Treatment Diagnosis: Left hand pain; left hand stiffness    Impression/Assessment: Pt is a 58 year old male presenting to Occupational Therapy due to the above condition.  The following significant findings have been identified: Impaired ROM, Impaired strength, Pain, and Post-surgical precautions.  These identified deficits interfere with their ability to perform self care tasks, work tasks, recreational activities, household chores, driving , and meal planning and preparation as compared to previous level of function.   Patient's limitations or Problem List includes: Pain, Decreased ROM/motion, Increased edema, and Decreased  of the left ring finger and small finger which interferes with the patient's ability to perform Self Care Tasks (dressing, eating, bathing, hygiene/toileting), Work Tasks, Recreational Activities, Household Chores, and Driving  as compared to previous level of function.    Clinical Decision Making (Complexity):  Assessment of Occupational Performance: 5 or more Performance Deficits  Occupational Performance Limitations: bathing/showering, toileting, dressing, feeding, hygiene and grooming, driving and community mobility, home establishment and management, meal preparation and cleanup, shopping, work, and leisure activities  Clinical Decision Making (Complexity): Low complexity    PLAN OF CARE  Treatment Interventions:  Modalities:  Paraffin  Therapeutic Exercise:  AROM, AAROM, PROM, Tendon Gliding, Blocking, Reverse Blocking, Extensor Tracking, and Isotonics  Manual Techniques:   Scar mobilization, Myofascial release, and Manual edema mobilization  Orthotic Fabrication:  Hand-based ulnar gutter orthosis    Long Term Goals   OT Goal 1  Goal Identifier: ADL  Goal Description: Able to hold washcloth in left hand to wash face.  Rationale: In order to maximize safety and independence with performance of self-care activities  Target Date: 11/08/23  OT Goal 2  Goal Identifier: IADL  Goal Description: Able to hold a shopping bag in left hand with 2/10 pain or less.  Target Date: 12/06/23      Frequency of Treatment: 1x/wee  Duration of Treatment: 8 weeks     Recommended Referrals to Other Professionals:  N/A  Education Assessment: Learner/Method: Patient;No Barriers to Learning     Risks and benefits of evaluation/treatment have been explained.   Patient/Family/caregiver agrees with Plan of Care.     Evaluation Time:         Signing Clinician: Macrina Lovell OT

## 2023-10-11 NOTE — NURSING NOTE
Reason For Visit:   Chief Complaint   Patient presents with    Surgical Followup     Follow up on closed vs open reduction with percutaneous pinning of left ring finger DOS 9/28/23       Primary MD: No Ref-Primary, Physician  Ref. MD: jad    Age: 58 year old    ?  No      There were no vitals taken for this visit.      Pain Assessment  Patient Currently in Pain: Yes  0-10 Pain Scale: 2  Primary Pain Location: Hand (left)  Pain Descriptors: Aching, Spasm    Hand Dominance Evaluation  Hand Dominance: Right          QuickDASH Assessment      9/25/2023     4:33 PM   QuickDASH Main   1. Open a tight or new jar Mild difficulty   2. Do heavy household chores (e.g., wash walls, floors) Mild difficulty   3. Carry a shopping bag or briefcase Mild difficulty   4. Wash your back Mild difficulty   5. Use a knife to cut food Mild difficulty   6. Recreational activities in which you take some force or impact through your arm, shoulder or hand (e.g., golf, hammering, tennis, etc.) Mild difficulty   7. During the past week, to what extent has your arm, shoulder or hand problem interfered with your normal social activities with family, friends, neighbours or groups Slightly   8. During the past week, were you limited in your work or other regular daily activities as a result of your arm, shoulder or hand problem Slightly limited   9. Arm, shoulder or hand pain Mild   10.Tingling (pins and needles) in your arm,shoulder or hand None   11. During the past week, how much difficulty have you had sleeping because of the pain in your arm, shoulder or hand Mild difficulty   Quickdash Ability Score 22.73          Current Outpatient Medications   Medication Sig Dispense Refill    losartan (COZAAR) 50 MG tablet 1 tab(s) orally once a day for 90         No Known Allergies    Valentina Bae, ATC

## 2023-10-11 NOTE — LETTER
10/11/2023         RE: Ashok Blood  [address]  [place] [address]        Dear Colleague,    Thank you for referring your patient, Ashok Blood, to the [facility] [place]. Please see a copy of my visit note below.    Chief Complaint:   Chief Complaint   Patient presents with    Surgical Followup     Follow up on closed vs open reduction with percutaneous pinning of left ring finger DOS 9/28/23       Referring Physician: No ref. provider found    Date of Injury: 9/19/2023  Mechanism of Injury: fall  Diagnosis: left 4th and 5th proximal phalanx fractures  Surgery: 9/28/2023: left 4th and 5th P1 CRPP    History of Present Illness: Ashok Blood is a 58 year old RHD male. He injured his hand after a fall while walking his dog.    10/11/2023: presents for first postoperative visit. Pain well controlled, no issues with the pins.     Occupation:     Physical Examination:  There were no vitals filed for this visit.  There is no height or weight on file to calculate BMI.    Well appearing, well nourished  Alert and oriented x 3, cooperative with exam     Left hand  Pin sites are clean and dry  No evidence of malrotation  IP joints are stiff but she is able to move PIPs and DIPs passively without pain  Motor Exam: Intact TU/OK/x2-3  Sensory Exam: Sensation intact to light touch in FDWS (radial), volar IF (median), volar SF (ulnar)  Vascular Exam: 2+ radial pulse, < 2 sec capillary refill    Imaging/Studies:  XR (3 views) of the left hand was obtained 10/11/2023.  I reviewed the images with the patient.  The imaging study shows  left ring and small finger P1 fractures well aligned with pin fixation .    Assessment: Ashok Blood is a 58 year old male with left ring and small finger P1 fractures treated with CRPP.    Plan:   I had a discussion with the patient regarding my clinical findings, diagnosis, and treatment plan. We reviewed the  post-operative plan, frequency of follow-up, rehabilitation, and expected outcomes. One of the pins was loose and was removed from the ring finger. All questions answered.     NWB left upper extremity.   OK to wash hands/shower.  Do not submerge incision until 4 weeks from the date of surgery. Gently dry off pin sites after.   Reviewed DIP and PIP passive ROM exercises to avoid tendon adhesions    Next Visit:   Follow-up: 2 week(s).   Imaging: XR left hand .   Plan: review imaging, likely pin removal if clinical and radiographic evidence of healing      DEVORAH CHEATHAM MD

## 2023-10-11 NOTE — PROGRESS NOTES
Chief Complaint:   Chief Complaint   Patient presents with    Surgical Followup     Follow up on closed vs open reduction with percutaneous pinning of left ring finger DOS 9/28/23       Referring Physician: No ref. provider found    Date of Injury: 9/19/2023  Mechanism of Injury: fall  Diagnosis: left 4th and 5th proximal phalanx fractures  Surgery: 9/28/2023: left 4th and 5th P1 CRPP    History of Present Illness: Ashok Blood is a 58 year old RHD male. He injured his hand after a fall while walking his dog.    10/11/2023: presents for first postoperative visit. Pain well controlled, no issues with the pins.     Occupation:     Physical Examination:  There were no vitals filed for this visit.  There is no height or weight on file to calculate BMI.    Well appearing, well nourished  Alert and oriented x 3, cooperative with exam     Left hand  Pin sites are clean and dry  No evidence of malrotation  IP joints are stiff but she is able to move PIPs and DIPs passively without pain  Motor Exam: Intact TU/OK/x2-3  Sensory Exam: Sensation intact to light touch in FDWS (radial), volar IF (median), volar SF (ulnar)  Vascular Exam: 2+ radial pulse, < 2 sec capillary refill    Imaging/Studies:  XR (3 views) of the left hand was obtained 10/11/2023.  I reviewed the images with the patient.  The imaging study shows  left ring and small finger P1 fractures well aligned with pin fixation .    Assessment: Ashok Blood is a 58 year old male with left ring and small finger P1 fractures treated with CRPP.    Plan:   I had a discussion with the patient regarding my clinical findings, diagnosis, and treatment plan. We reviewed the post-operative plan, frequency of follow-up, rehabilitation, and expected outcomes. One of the pins was loose and was removed from the ring finger. All questions answered.     NWB left upper extremity.   OK to wash hands/shower.  Do not submerge incision until 4 weeks from the date  of surgery. Gently dry off pin sites after.   Reviewed DIP and PIP passive ROM exercises to avoid tendon adhesions    Next Visit:   Follow-up: 2 week(s).   Imaging: XR left hand .   Plan: review imaging, likely pin removal if clinical and radiographic evidence of healing      DEVORAH CHEATHAM MD

## 2023-10-15 ENCOUNTER — HEALTH MAINTENANCE LETTER (OUTPATIENT)
Age: 58
End: 2023-10-15

## 2023-10-18 ENCOUNTER — THERAPY VISIT (OUTPATIENT)
Dept: OCCUPATIONAL THERAPY | Facility: CLINIC | Age: 58
End: 2023-10-18
Payer: COMMERCIAL

## 2023-10-18 DIAGNOSIS — Z47.89 AFTERCARE FOLLOWING SURGERY OF THE MUSCULOSKELETAL SYSTEM: ICD-10-CM

## 2023-10-18 DIAGNOSIS — M79.645 PAIN OF FINGER OF LEFT HAND: Primary | ICD-10-CM

## 2023-10-18 PROCEDURE — 97110 THERAPEUTIC EXERCISES: CPT | Mod: GO | Performed by: OCCUPATIONAL THERAPIST

## 2023-10-18 PROCEDURE — 97760 ORTHOTIC MGMT&TRAING 1ST ENC: CPT | Mod: GO | Performed by: OCCUPATIONAL THERAPIST

## 2023-10-19 DIAGNOSIS — M79.642 HAND PAIN, LEFT: Primary | ICD-10-CM

## 2023-10-20 NOTE — PROGRESS NOTES
Chief Complaint:   Chief Complaint   Patient presents with    Surgical Followup     Follow-up 1. Left small finger proximal phalanx CRPP  2. Left ring finger proximal phalanx CRPP  DOS: 9/28/23         Referring Physician: No ref. provider found    Date of Injury: 9/19/2023  Mechanism of Injury: fall  Diagnosis: left 4th and 5th proximal phalanx fractures  Surgery: 9/28/2023: left 4th and 5th P1 CRPP    History of Present Illness: Ashok Blood is a 58 year old RHD male. He injured his hand after a fall while walking his dog.    10/11/2023: presents for first postoperative visit. Pain well controlled, no issues with the pins.     10/25/2023: No pain in the finger, feels stiff    Occupation:     Physical Examination:  There were no vitals filed for this visit.  There is no height or weight on file to calculate BMI.    Well appearing, well nourished  Alert and oriented x 3, cooperative with exam     Left hand  Pin sites are clean and dry  No evidence of malrotation  IP joints are stiff but she is able to move PIPs and DIPs passively without significant pain  Motor Exam: Intact TU/OK/x2-3  Sensory Exam: Sensation intact to light touch in FDWS (radial), volar IF (median), volar SF (ulnar)  Vascular Exam: 2+ radial pulse, < 2 sec capillary refill    Imaging/Studies:  XR (3 views) of the left hand was obtained 10/25/2023.  I reviewed the images with the patient.  The imaging study shows left ring and small finger P1 fractures well aligned with pin fixation, evidence of callous formation    XR (3 views) of the left hand was obtained 10/11/2023.  I reviewed the images with the patient.  The imaging study shows  left ring and small finger P1 fractures well aligned with pin fixation .    Assessment: Ashok Blood is a 58 year old male with left ring and small finger P1 fractures treated with CRPP.    Plan:   I had a discussion with the patient regarding my clinical findings, diagnosis, and treatment  plan. We reviewed the post-operative plan, frequency of follow-up, rehabilitation, and expected outcomes. Pins removed today. All questions answered.     PWB (< 5 lbs) left upper extremity.   Continue ROM exercises, hand therapy for ROM    Next Visit:   Follow-up: 4 week(s).   Imaging: XR left hand .   Plan: review imaging, ROM check      DEVORAH CHEATHAM MD

## 2023-10-25 ENCOUNTER — OFFICE VISIT (OUTPATIENT)
Dept: ORTHOPEDICS | Facility: CLINIC | Age: 58
End: 2023-10-25
Payer: COMMERCIAL

## 2023-10-25 ENCOUNTER — THERAPY VISIT (OUTPATIENT)
Dept: OCCUPATIONAL THERAPY | Facility: CLINIC | Age: 58
End: 2023-10-25
Payer: COMMERCIAL

## 2023-10-25 ENCOUNTER — ANCILLARY PROCEDURE (OUTPATIENT)
Dept: GENERAL RADIOLOGY | Facility: CLINIC | Age: 58
End: 2023-10-25
Attending: STUDENT IN AN ORGANIZED HEALTH CARE EDUCATION/TRAINING PROGRAM
Payer: COMMERCIAL

## 2023-10-25 DIAGNOSIS — S62.617A CLOSED DISPLACED FRACTURE OF PROXIMAL PHALANX OF LEFT LITTLE FINGER, INITIAL ENCOUNTER: ICD-10-CM

## 2023-10-25 DIAGNOSIS — M79.642 HAND PAIN, LEFT: ICD-10-CM

## 2023-10-25 DIAGNOSIS — M79.645 PAIN OF FINGER OF LEFT HAND: Primary | ICD-10-CM

## 2023-10-25 DIAGNOSIS — S62.615A CLOSED DISPLACED FRACTURE OF PROXIMAL PHALANX OF LEFT RING FINGER, INITIAL ENCOUNTER: Primary | ICD-10-CM

## 2023-10-25 DIAGNOSIS — Z47.89 AFTERCARE FOLLOWING SURGERY OF THE MUSCULOSKELETAL SYSTEM: ICD-10-CM

## 2023-10-25 PROCEDURE — 97763 ORTHC/PROSTC MGMT SBSQ ENC: CPT | Mod: GO | Performed by: OCCUPATIONAL THERAPIST

## 2023-10-25 PROCEDURE — 97110 THERAPEUTIC EXERCISES: CPT | Mod: GO | Performed by: OCCUPATIONAL THERAPIST

## 2023-10-25 PROCEDURE — 99024 POSTOP FOLLOW-UP VISIT: CPT | Performed by: STUDENT IN AN ORGANIZED HEALTH CARE EDUCATION/TRAINING PROGRAM

## 2023-10-25 PROCEDURE — 73130 X-RAY EXAM OF HAND: CPT | Mod: LT | Performed by: RADIOLOGY

## 2023-10-25 NOTE — NURSING NOTE
Reason For Visit:   Chief Complaint   Patient presents with    Surgical Followup     Follow-up 1. Left small finger proximal phalanx CRPP  2. Left ring finger proximal phalanx CRPP  DOS: 9/28/23         Primary MD: Ashley Ref-Primary, Physician  Ref. MD: Savannah    Age: 58 year old    ?  No      There were no vitals taken for this visit.      Pain Assessment  Patient Currently in Pain: Yes  0-10 Pain Scale: 1 (when bumped)  Primary Pain Location: Finger (Comment which one) (left ring and small fingers)  Pain Descriptors: Intermittent    Hand Dominance Evaluation  Hand Dominance: Right          QuickDASH Assessment      9/25/2023     4:33 PM   QuickDASH Main   1. Open a tight or new jar Mild difficulty   2. Do heavy household chores (e.g., wash walls, floors) Mild difficulty   3. Carry a shopping bag or briefcase Mild difficulty   4. Wash your back Mild difficulty   5. Use a knife to cut food Mild difficulty   6. Recreational activities in which you take some force or impact through your arm, shoulder or hand (e.g., golf, hammering, tennis, etc.) Mild difficulty   7. During the past week, to what extent has your arm, shoulder or hand problem interfered with your normal social activities with family, friends, neighbours or groups Slightly   8. During the past week, were you limited in your work or other regular daily activities as a result of your arm, shoulder or hand problem Slightly limited   9. Arm, shoulder or hand pain Mild   10.Tingling (pins and needles) in your arm,shoulder or hand None   11. During the past week, how much difficulty have you had sleeping because of the pain in your arm, shoulder or hand Mild difficulty   Quickdash Ability Score 22.73          Current Outpatient Medications   Medication Sig Dispense Refill    losartan (COZAAR) 50 MG tablet 1 tab(s) orally once a day for 90         No Known Allergies    GEOVANNA LYN, ATC

## 2023-10-25 NOTE — LETTER
10/25/2023         RE: Ashok Blood  [address]  [facility] [address]        Dear Colleague,    Thank you for referring your patient, Ashok Blood, to the [facility] [place]. Please see a copy of my visit note below.    Chief Complaint:   Chief Complaint   Patient presents with    Surgical Followup     Follow-up 1. Left small finger proximal phalanx CRPP  2. Left ring finger proximal phalanx CRPP  DOS: 9/28/23         Referring Physician: No ref. provider found    Date of Injury: 9/19/2023  Mechanism of Injury: fall  Diagnosis: left 4th and 5th proximal phalanx fractures  Surgery: 9/28/2023: left 4th and 5th P1 CRPP    History of Present Illness: Ashok Blood is a 58 year old RHD male. He injured his hand after a fall while walking his dog.    10/11/2023: presents for first postoperative visit. Pain well controlled, no issues with the pins.     10/25/2023: No pain in the finger, feels stiff    Occupation:     Physical Examination:  There were no vitals filed for this visit.  There is no height or weight on file to calculate BMI.    Well appearing, well nourished  Alert and oriented x 3, cooperative with exam     Left hand  Pin sites are clean and dry  No evidence of malrotation  IP joints are stiff but she is able to move PIPs and DIPs passively without significant pain  Motor Exam: Intact TU/OK/x2-3  Sensory Exam: Sensation intact to light touch in FDWS (radial), volar IF (median), volar SF (ulnar)  Vascular Exam: 2+ radial pulse, < 2 sec capillary refill    Imaging/Studies:  XR (3 views) of the left hand was obtained 10/25/2023.  I reviewed the images with the patient.  The imaging study shows left ring and small finger P1 fractures well aligned with pin fixation, evidence of callous formation    XR (3 views) of the left hand was obtained 10/11/2023.  I reviewed the images with the patient.  The imaging study shows  left ring and small finger  P1 fractures well aligned with pin fixation .    Assessment: Ashok Blood is a 58 year old male with left ring and small finger P1 fractures treated with CRPP.    Plan:   I had a discussion with the patient regarding my clinical findings, diagnosis, and treatment plan. We reviewed the post-operative plan, frequency of follow-up, rehabilitation, and expected outcomes. Pins removed today. All questions answered.     PWB (< 5 lbs) left upper extremity.   Continue ROM exercises, hand therapy for ROM    Next Visit:   Follow-up: 4 week(s).   Imaging: XR left hand .   Plan: review imaging, ROM check      DEVORAH CHEATHAM MD

## 2023-10-30 ENCOUNTER — LAB REQUISITION (OUTPATIENT)
Dept: LAB | Facility: CLINIC | Age: 58
End: 2023-10-30

## 2023-10-30 DIAGNOSIS — I10 ESSENTIAL (PRIMARY) HYPERTENSION: ICD-10-CM

## 2023-10-30 DIAGNOSIS — Z12.5 ENCOUNTER FOR SCREENING FOR MALIGNANT NEOPLASM OF PROSTATE: ICD-10-CM

## 2023-10-30 LAB
CHOLEST SERPL-MCNC: 193 MG/DL
HDLC SERPL-MCNC: 38 MG/DL
LDLC SERPL CALC-MCNC: 95 MG/DL
NONHDLC SERPL-MCNC: 155 MG/DL
PSA SERPL DL<=0.01 NG/ML-MCNC: 2.42 NG/ML (ref 0–3.5)
TRIGL SERPL-MCNC: 302 MG/DL

## 2023-10-30 PROCEDURE — G0103 PSA SCREENING: HCPCS | Performed by: FAMILY MEDICINE

## 2023-10-30 PROCEDURE — 80061 LIPID PANEL: CPT | Performed by: FAMILY MEDICINE

## 2023-11-02 ENCOUNTER — THERAPY VISIT (OUTPATIENT)
Dept: OCCUPATIONAL THERAPY | Facility: CLINIC | Age: 58
End: 2023-11-02
Payer: COMMERCIAL

## 2023-11-02 DIAGNOSIS — Z47.89 AFTERCARE FOLLOWING SURGERY OF THE MUSCULOSKELETAL SYSTEM: ICD-10-CM

## 2023-11-02 DIAGNOSIS — M79.645 PAIN OF FINGER OF LEFT HAND: Primary | ICD-10-CM

## 2023-11-02 PROCEDURE — 97110 THERAPEUTIC EXERCISES: CPT | Mod: GO | Performed by: OCCUPATIONAL THERAPIST

## 2023-11-08 ENCOUNTER — THERAPY VISIT (OUTPATIENT)
Dept: OCCUPATIONAL THERAPY | Facility: CLINIC | Age: 58
End: 2023-11-08
Payer: COMMERCIAL

## 2023-11-08 DIAGNOSIS — Z47.89 AFTERCARE FOLLOWING SURGERY OF THE MUSCULOSKELETAL SYSTEM: ICD-10-CM

## 2023-11-08 DIAGNOSIS — M79.645 PAIN OF FINGER OF LEFT HAND: Primary | ICD-10-CM

## 2023-11-08 PROCEDURE — 97110 THERAPEUTIC EXERCISES: CPT | Mod: GO | Performed by: OCCUPATIONAL THERAPIST

## 2023-11-10 DIAGNOSIS — M79.642 HAND PAIN, LEFT: Primary | ICD-10-CM

## 2023-11-15 ENCOUNTER — THERAPY VISIT (OUTPATIENT)
Dept: OCCUPATIONAL THERAPY | Facility: CLINIC | Age: 58
End: 2023-11-15
Payer: COMMERCIAL

## 2023-11-15 DIAGNOSIS — M79.645 PAIN OF FINGER OF LEFT HAND: Primary | ICD-10-CM

## 2023-11-15 DIAGNOSIS — Z47.89 AFTERCARE FOLLOWING SURGERY OF THE MUSCULOSKELETAL SYSTEM: ICD-10-CM

## 2023-11-15 PROCEDURE — 97110 THERAPEUTIC EXERCISES: CPT | Mod: GO | Performed by: OCCUPATIONAL THERAPIST

## 2023-11-18 NOTE — PROGRESS NOTES
Chief Complaint:   Chief Complaint   Patient presents with    Surgical Followup     Follow-up 1. Left small finger proximal phalanx CRPP  2. Left ring finger proximal phalanx CRPP  DOS: 9/28/23     Referring Physician: No ref. provider found    Date of Injury: 9/19/2023  Mechanism of Injury: fall  Diagnosis: left 4th and 5th proximal phalanx fractures  Surgery: 9/28/2023: left 4th and 5th P1 CRPP    History of Present Illness: Ashok Blood is a 58 year old RHD male. He injured his hand after a fall while walking his dog.    10/11/2023: presents for first postoperative visit. Pain well controlled, no issues with the pins.     10/25/2023: No pain in the finger, feels stiff    11/22/2023: Patient reports that he is doing well.  He continues to improve with range of motion.  Hand is fairly functional for most activities.  Denies pain.    Occupation:     Physical Examination:    Well appearing, well nourished  Alert and oriented x 3, cooperative with exam     Left hand  Pin sites are healed  No evidence of malrotation  Ring fingertip 2.5 cm from DPC, small fingertip 2 cm from DPC  Motor Exam: Intact TU/OK/x2-3  Sensory Exam: Sensation intact to light touch in FDWS (radial), volar IF (median), volar SF (ulnar)  Vascular Exam: 2+ radial pulse, < 2 sec capillary refill    Imaging/Studies:  XR (3 views) of the left hand was obtained 11/22/2023.  I reviewed the images with the patient.  The imaging study shows left ring and small finger P1 fractures well aligned, continued healing, stable alignment.     XR (3 views) of the left hand was obtained 10/25/2023.  I reviewed the images with the patient.  The imaging study shows left ring and small finger P1 fractures well aligned with pin fixation, evidence of callous formation    XR (3 views) of the left hand was obtained 10/11/2023.  I reviewed the images with the patient.  The imaging study shows  left ring and small finger P1 fractures well aligned with pin  fixation .    Assessment: Ashok Blood is a 58 year old male with left ring and small finger P1 fractures treated with CRPP.    Plan:   I had a discussion with the patient regarding my clinical findings, diagnosis, and treatment plan. We reviewed the post-operative plan, frequency of follow-up, rehabilitation, and expected outcomes. Pins removed today. All questions answered.     WBAT left upper extremity.   Continue ROM exercises    Next Visit:   Follow-up: 4 week(s).   Imaging: XR left hand .   Plan: review imaging, ROM check    SURYA HAYWOOD PA-C  Orthopaedic Surgery

## 2023-11-22 ENCOUNTER — ANCILLARY PROCEDURE (OUTPATIENT)
Dept: GENERAL RADIOLOGY | Facility: CLINIC | Age: 58
End: 2023-11-22
Attending: STUDENT IN AN ORGANIZED HEALTH CARE EDUCATION/TRAINING PROGRAM
Payer: COMMERCIAL

## 2023-11-22 ENCOUNTER — OFFICE VISIT (OUTPATIENT)
Dept: ORTHOPEDICS | Facility: CLINIC | Age: 58
End: 2023-11-22
Payer: COMMERCIAL

## 2023-11-22 DIAGNOSIS — S62.615A CLOSED DISPLACED FRACTURE OF PROXIMAL PHALANX OF LEFT RING FINGER, INITIAL ENCOUNTER: Primary | ICD-10-CM

## 2023-11-22 DIAGNOSIS — M79.642 HAND PAIN, LEFT: ICD-10-CM

## 2023-11-22 DIAGNOSIS — Z98.890 POST-OPERATIVE STATE: ICD-10-CM

## 2023-11-22 DIAGNOSIS — S62.617A CLOSED DISPLACED FRACTURE OF PROXIMAL PHALANX OF LEFT LITTLE FINGER, INITIAL ENCOUNTER: ICD-10-CM

## 2023-11-22 PROCEDURE — 73130 X-RAY EXAM OF HAND: CPT | Mod: LT | Performed by: RADIOLOGY

## 2023-11-22 PROCEDURE — 99024 POSTOP FOLLOW-UP VISIT: CPT | Performed by: PHYSICIAN ASSISTANT

## 2023-11-22 NOTE — NURSING NOTE
Reason For Visit:   Chief Complaint   Patient presents with    Surgical Followup     Follow-up 1. Left small finger proximal phalanx CRPP  2. Left ring finger proximal phalanx CRPP  DOS: 9/28/23       Primary MD: Ashley Ref-Primary, Physician  Ref. MD: jad    Age: 58 year old    ?  No      There were no vitals taken for this visit.      Pain Assessment  Patient Currently in Pain: Yes  0-10 Pain Scale: 0  Primary Pain Location: Finger (Comment which one) (left ring and small)  Pain Descriptors: Tightness    Hand Dominance Evaluation  Hand Dominance: Right          QuickDASH Assessment      11/22/2023    12:44 PM   QuickDASH Main   1. Open a tight or new jar Moderate difficulty   2. Do heavy household chores (e.g., wash walls, floors) Mild difficulty   3. Carry a shopping bag or briefcase No difficulty   4. Wash your back No difficulty   5. Use a knife to cut food Mild difficulty   6. Recreational activities in which you take some force or impact through your arm, shoulder or hand (e.g., golf, hammering, tennis, etc.) Mild difficulty   7. During the past week, to what extent has your arm, shoulder or hand problem interfered with your normal social activities with family, friends, neighbours or groups Not at all   8. During the past week, were you limited in your work or other regular daily activities as a result of your arm, shoulder or hand problem Not limited at all   9. Arm, shoulder or hand pain Mild   10.Tingling (pins and needles) in your arm,shoulder or hand Mild   11. During the past week, how much difficulty have you had sleeping because of the pain in your arm, shoulder or hand No difficulty   Quickdash Ability Score 15.91          Current Outpatient Medications   Medication Sig Dispense Refill    losartan (COZAAR) 50 MG tablet 1 tab(s) orally once a day for 90         No Known Allergies    Valentina Bae, ATC

## 2023-11-22 NOTE — LETTER
11/22/2023         RE: Ashok Blood  [address]  [facility] [address]        Dear Colleague,    Thank you for referring your patient, Ashok Blood, to the [facility] [place]. Please see a copy of my visit note below.    Chief Complaint:   Chief Complaint   Patient presents with    Surgical Followup     Follow-up 1. Left small finger proximal phalanx CRPP  2. Left ring finger proximal phalanx CRPP  DOS: 9/28/23     Referring Physician: No ref. provider found    Date of Injury: 9/19/2023  Mechanism of Injury: fall  Diagnosis: left 4th and 5th proximal phalanx fractures  Surgery: 9/28/2023: left 4th and 5th P1 CRPP    History of Present Illness: Ashok Blood is a 58 year old RHD male. He injured his hand after a fall while walking his dog.    10/11/2023: presents for first postoperative visit. Pain well controlled, no issues with the pins.     10/25/2023: No pain in the finger, feels stiff    11/22/2023: Patient reports that he is doing well.  He continues to improve with range of motion.  Hand is fairly functional for most activities.  Denies pain.    Occupation:     Physical Examination:    Well appearing, well nourished  Alert and oriented x 3, cooperative with exam     Left hand  Pin sites are healed  No evidence of malrotation  Ring fingertip 2.5 cm from DPC, small fingertip 2 cm from DPC  Motor Exam: Intact TU/OK/x2-3  Sensory Exam: Sensation intact to light touch in FDWS (radial), volar IF (median), volar SF (ulnar)  Vascular Exam: 2+ radial pulse, < 2 sec capillary refill    Imaging/Studies:  XR (3 views) of the left hand was obtained 11/22/2023.  I reviewed the images with the patient.  The imaging study shows left ring and small finger P1 fractures well aligned, continued healing, stable alignment.     XR (3 views) of the left hand was obtained 10/25/2023.  I reviewed the images with the patient.  The imaging study shows left ring and  small finger P1 fractures well aligned with pin fixation, evidence of callous formation    XR (3 views) of the left hand was obtained 10/11/2023.  I reviewed the images with the patient.  The imaging study shows  left ring and small finger P1 fractures well aligned with pin fixation .    Assessment: Ashok Blood is a 58 year old male with left ring and small finger P1 fractures treated with CRPP.    Plan:   I had a discussion with the patient regarding my clinical findings, diagnosis, and treatment plan. We reviewed the post-operative plan, frequency of follow-up, rehabilitation, and expected outcomes. Pins removed today. All questions answered.     WBAT left upper extremity.   Continue ROM exercises    Next Visit:   Follow-up: 4 week(s).   Imaging: XR left hand .   Plan: review imaging, ROM check    SURYA HAYWOOD PA-C  Orthopaedic Surgery

## 2023-11-28 ENCOUNTER — THERAPY VISIT (OUTPATIENT)
Dept: OCCUPATIONAL THERAPY | Facility: CLINIC | Age: 58
End: 2023-11-28
Payer: COMMERCIAL

## 2023-11-28 DIAGNOSIS — Z47.89 AFTERCARE FOLLOWING SURGERY OF THE MUSCULOSKELETAL SYSTEM: ICD-10-CM

## 2023-11-28 DIAGNOSIS — M79.645 PAIN OF FINGER OF LEFT HAND: Primary | ICD-10-CM

## 2023-11-28 PROCEDURE — 97110 THERAPEUTIC EXERCISES: CPT | Mod: GO | Performed by: OCCUPATIONAL THERAPIST

## 2023-12-08 DIAGNOSIS — M79.642 HAND PAIN, LEFT: Primary | ICD-10-CM

## 2023-12-14 NOTE — PROGRESS NOTES
Chief Complaint:   Chief Complaint   Patient presents with    RECHECK     Follow-up 1. Left small finger proximal phalanx CRPP  2. Left ring finger proximal phalanx CRPP  DOS: 9/28/23       Referring Physician: No ref. provider found    Date of Injury: 9/19/2023  Mechanism of Injury: fall  Diagnosis: left 4th and 5th proximal phalanx fractures  Surgery: 9/28/2023: left 4th and 5th P1 CRPP    History of Present Illness: Ashok Blood is a 58 year old RHD male. He injured his hand after a fall while walking his dog.    10/11/2023: presents for first postoperative visit. Pain well controlled, no issues with the pins.     10/25/2023: No pain in the finger, feels stiff    11/22/2023: Patient reports that he is doing well.  He continues to improve with range of motion.  Hand is fairly functional for most activities.  Denies pain.    12/20/2023: no pain in the fingers, improving range of motion, feels some dorsal tightness in the hand    Occupation:     Physical Examination:    Well appearing, well nourished  Alert and oriented x 3, cooperative with exam     Left hand  Pin sites are healed  No evidence of malrotation  Ring and small fingertips touching palm  No TTP over fracture sites  Motor Exam: Intact TU/OK/x2-3  Sensory Exam: Sensation intact to light touch in FDWS (radial), volar IF (median), volar SF (ulnar)  Vascular Exam: 2+ radial pulse, < 2 sec capillary refill    Imaging/Studies:  XR (3 views) of the left hand was obtained 12/20/2023.  I reviewed the images with the patient.  The imaging study shows left ring and small finger P1 fractures well aligned, continued healing, stable alignment.    XR (3 views) of the left hand was obtained 11/22/2023.  I reviewed the images with the patient.  The imaging study shows left ring and small finger P1 fractures well aligned, continued healing, stable alignment.     XR (3 views) of the left hand was obtained 10/25/2023.  I reviewed the images with the  patient.  The imaging study shows left ring and small finger P1 fractures well aligned with pin fixation, evidence of callous formation    XR (3 views) of the left hand was obtained 10/11/2023.  I reviewed the images with the patient.  The imaging study shows  left ring and small finger P1 fractures well aligned with pin fixation .    Assessment: Ashok Blood is a 58 year old male with left ring and small finger P1 fractures treated with CRPP.    Plan:   I had a discussion with the patient regarding my clinical findings, diagnosis, and treatment plan. We reviewed the post-operative plan, frequency of follow-up, rehabilitation, and expected outcomes. All questions answered.     WBAT left upper extremity.   Continue ROM exercises    Next Visit:   Follow-up: as needed    DEVORAH CHEATHAM MD

## 2023-12-20 ENCOUNTER — OFFICE VISIT (OUTPATIENT)
Dept: ORTHOPEDICS | Facility: CLINIC | Age: 58
End: 2023-12-20
Payer: COMMERCIAL

## 2023-12-20 ENCOUNTER — ANCILLARY PROCEDURE (OUTPATIENT)
Dept: GENERAL RADIOLOGY | Facility: CLINIC | Age: 58
End: 2023-12-20
Attending: STUDENT IN AN ORGANIZED HEALTH CARE EDUCATION/TRAINING PROGRAM
Payer: COMMERCIAL

## 2023-12-20 ENCOUNTER — THERAPY VISIT (OUTPATIENT)
Dept: OCCUPATIONAL THERAPY | Facility: CLINIC | Age: 58
End: 2023-12-20
Payer: COMMERCIAL

## 2023-12-20 DIAGNOSIS — Z47.89 AFTERCARE FOLLOWING SURGERY OF THE MUSCULOSKELETAL SYSTEM: ICD-10-CM

## 2023-12-20 DIAGNOSIS — S62.615A CLOSED DISPLACED FRACTURE OF PROXIMAL PHALANX OF LEFT RING FINGER, INITIAL ENCOUNTER: Primary | ICD-10-CM

## 2023-12-20 DIAGNOSIS — M79.645 PAIN OF FINGER OF LEFT HAND: Primary | ICD-10-CM

## 2023-12-20 DIAGNOSIS — S62.617A CLOSED DISPLACED FRACTURE OF PROXIMAL PHALANX OF LEFT LITTLE FINGER, INITIAL ENCOUNTER: ICD-10-CM

## 2023-12-20 DIAGNOSIS — M79.642 HAND PAIN, LEFT: ICD-10-CM

## 2023-12-20 PROCEDURE — 73130 X-RAY EXAM OF HAND: CPT | Mod: LT | Performed by: RADIOLOGY

## 2023-12-20 PROCEDURE — 97110 THERAPEUTIC EXERCISES: CPT | Mod: GO | Performed by: OCCUPATIONAL THERAPIST

## 2023-12-20 PROCEDURE — 99024 POSTOP FOLLOW-UP VISIT: CPT | Performed by: STUDENT IN AN ORGANIZED HEALTH CARE EDUCATION/TRAINING PROGRAM

## 2023-12-20 NOTE — NURSING NOTE
Reason For Visit:   Chief Complaint   Patient presents with    RECHECK     Follow-up 1. Left small finger proximal phalanx CRPP  2. Left ring finger proximal phalanx CRPP  DOS: 9/28/23         Primary MD: Ashley Ref-Primary, Physician  Ref. MD: Savannah    Age: 58 year old    ?  No      There were no vitals taken for this visit.      Pain Assessment  Patient Currently in Pain: No (patient denies any pain)    Hand Dominance Evaluation  Hand Dominance: Right          QuickDASH Assessment      12/20/2023    12:57 PM   QuickDASH Main   1. Open a tight or new jar No difficulty   2. Do heavy household chores (e.g., wash walls, floors) No difficulty   3. Carry a shopping bag or briefcase No difficulty   4. Wash your back No difficulty   5. Use a knife to cut food No difficulty   6. Recreational activities in which you take some force or impact through your arm, shoulder or hand (e.g., golf, hammering, tennis, etc.) No difficulty   7. During the past week, to what extent has your arm, shoulder or hand problem interfered with your normal social activities with family, friends, neighbours or groups Not at all   8. During the past week, were you limited in your work or other regular daily activities as a result of your arm, shoulder or hand problem Not limited at all   9. Arm, shoulder or hand pain Mild   10.Tingling (pins and needles) in your arm,shoulder or hand None   11. During the past week, how much difficulty have you had sleeping because of the pain in your arm, shoulder or hand No difficulty   Quickdash Ability Score 2.27          Current Outpatient Medications   Medication Sig Dispense Refill    losartan (COZAAR) 50 MG tablet 1 tab(s) orally once a day for 90         No Known Allergies    GEOVANNA LYN, ATC

## 2023-12-20 NOTE — LETTER
12/20/2023         RE: Ashok Blood  [address]  [place] [address]        Dear Colleague,    Thank you for referring your patient, Ashok Blood, to the [facility] [place]. Please see a copy of my visit note below.    Chief Complaint:   Chief Complaint   Patient presents with    RECHECK     Follow-up 1. Left small finger proximal phalanx CRPP  2. Left ring finger proximal phalanx CRPP  DOS: 9/28/23       Referring Physician: No ref. provider found    Date of Injury: 9/19/2023  Mechanism of Injury: fall  Diagnosis: left 4th and 5th proximal phalanx fractures  Surgery: 9/28/2023: left 4th and 5th P1 CRPP    History of Present Illness: Ashok Blood is a 58 year old RHD male. He injured his hand after a fall while walking his dog.    10/11/2023: presents for first postoperative visit. Pain well controlled, no issues with the pins.     10/25/2023: No pain in the finger, feels stiff    11/22/2023: Patient reports that he is doing well.  He continues to improve with range of motion.  Hand is fairly functional for most activities.  Denies pain.    12/20/2023: no pain in the fingers, improving range of motion, feels some dorsal tightness in the hand    Occupation:     Physical Examination:    Well appearing, well nourished  Alert and oriented x 3, cooperative with exam     Left hand  Pin sites are healed  No evidence of malrotation  Ring and small fingertips touching palm  No TTP over fracture sites  Motor Exam: Intact TU/OK/x2-3  Sensory Exam: Sensation intact to light touch in FDWS (radial), volar IF (median), volar SF (ulnar)  Vascular Exam: 2+ radial pulse, < 2 sec capillary refill    Imaging/Studies:  XR (3 views) of the left hand was obtained 12/20/2023.  I reviewed the images with the patient.  The imaging study shows left ring and small finger P1 fractures well aligned, continued healing, stable alignment.    XR (3 views) of the left hand was  obtained 11/22/2023.  I reviewed the images with the patient.  The imaging study shows left ring and small finger P1 fractures well aligned, continued healing, stable alignment.     XR (3 views) of the left hand was obtained 10/25/2023.  I reviewed the images with the patient.  The imaging study shows left ring and small finger P1 fractures well aligned with pin fixation, evidence of callous formation    XR (3 views) of the left hand was obtained 10/11/2023.  I reviewed the images with the patient.  The imaging study shows  left ring and small finger P1 fractures well aligned with pin fixation .    Assessment: Ashok Blood is a 58 year old male with left ring and small finger P1 fractures treated with CRPP.    Plan:   I had a discussion with the patient regarding my clinical findings, diagnosis, and treatment plan. We reviewed the post-operative plan, frequency of follow-up, rehabilitation, and expected outcomes. All questions answered.     WBAT left upper extremity.   Continue ROM exercises    Next Visit:   Follow-up: as needed    DEVORAH CHEATHAM MD

## 2024-11-06 ENCOUNTER — LAB REQUISITION (OUTPATIENT)
Dept: LAB | Facility: CLINIC | Age: 59
End: 2024-11-06
Payer: COMMERCIAL

## 2024-11-06 DIAGNOSIS — I10 ESSENTIAL (PRIMARY) HYPERTENSION: ICD-10-CM

## 2024-11-06 DIAGNOSIS — Z12.5 ENCOUNTER FOR SCREENING FOR MALIGNANT NEOPLASM OF PROSTATE: ICD-10-CM

## 2024-11-06 LAB
ANION GAP SERPL CALCULATED.3IONS-SCNC: 10 MMOL/L (ref 7–15)
BUN SERPL-MCNC: 14.3 MG/DL (ref 6–20)
CALCIUM SERPL-MCNC: 9.2 MG/DL (ref 8.8–10.4)
CHLORIDE SERPL-SCNC: 104 MMOL/L (ref 98–107)
CHOLEST SERPL-MCNC: 228 MG/DL
CREAT SERPL-MCNC: 0.95 MG/DL (ref 0.67–1.17)
EGFRCR SERPLBLD CKD-EPI 2021: >90 ML/MIN/1.73M2
FASTING STATUS PATIENT QL REPORTED: ABNORMAL
FASTING STATUS PATIENT QL REPORTED: NORMAL
GLUCOSE SERPL-MCNC: 92 MG/DL (ref 70–99)
HCO3 SERPL-SCNC: 23 MMOL/L (ref 22–29)
HDLC SERPL-MCNC: 41 MG/DL
LDLC SERPL CALC-MCNC: 135 MG/DL
NONHDLC SERPL-MCNC: 187 MG/DL
POTASSIUM SERPL-SCNC: 4.7 MMOL/L (ref 3.4–5.3)
PSA SERPL DL<=0.01 NG/ML-MCNC: 3.43 NG/ML (ref 0–3.5)
SODIUM SERPL-SCNC: 137 MMOL/L (ref 135–145)
TRIGL SERPL-MCNC: 259 MG/DL

## 2024-11-06 PROCEDURE — G0103 PSA SCREENING: HCPCS | Mod: ORL | Performed by: FAMILY MEDICINE

## 2024-11-06 PROCEDURE — 80061 LIPID PANEL: CPT | Mod: ORL | Performed by: FAMILY MEDICINE

## 2024-11-06 PROCEDURE — 80048 BASIC METABOLIC PNL TOTAL CA: CPT | Mod: ORL | Performed by: FAMILY MEDICINE

## 2024-12-08 ENCOUNTER — HEALTH MAINTENANCE LETTER (OUTPATIENT)
Age: 59
End: 2024-12-08

## (undated) DEVICE — DRAPE C-ARM OEC MINI VIEW 6800   00-901917-01

## (undated) DEVICE — DRAPE STERI TOWEL LG 1010

## (undated) DEVICE — PACK HAND CUSTOM ASC

## (undated) DEVICE — SUCTION MANIFOLD NEPTUNE 2 SYS 1 PORT 702-025-000

## (undated) DEVICE — PREP CHLORAPREP 26ML TINTED ORANGE  260815

## (undated) DEVICE — SU MONOCRYL 4-0 PS-2 27" UND Y426H

## (undated) DEVICE — SOL NACL 0.9% IRRIG 500ML BOTTLE 2F7123

## (undated) DEVICE — SOL WATER IRRIG 500ML BOTTLE 2F7113

## (undated) DEVICE — GLOVE BIOGEL PI MICRO SZ 6.0 48560

## (undated) DEVICE — DRSG STERI STRIP 1/2X4" R1547

## (undated) DEVICE — COVER CAMERA IN-LIGHT DISP LT-C02

## (undated) DEVICE — LINEN ORTHO PACK 5446

## (undated) RX ORDER — FENTANYL CITRATE 50 UG/ML
INJECTION, SOLUTION INTRAMUSCULAR; INTRAVENOUS
Status: DISPENSED
Start: 2023-09-28

## (undated) RX ORDER — CEFAZOLIN SODIUM 2 G/50ML
SOLUTION INTRAVENOUS
Status: DISPENSED
Start: 2023-09-28

## (undated) RX ORDER — PROPOFOL 10 MG/ML
INJECTION, EMULSION INTRAVENOUS
Status: DISPENSED
Start: 2023-09-28

## (undated) RX ORDER — OXYCODONE HYDROCHLORIDE 5 MG/1
TABLET ORAL
Status: DISPENSED
Start: 2023-09-28